# Patient Record
Sex: FEMALE | Race: BLACK OR AFRICAN AMERICAN | NOT HISPANIC OR LATINO | Employment: FULL TIME | URBAN - METROPOLITAN AREA
[De-identification: names, ages, dates, MRNs, and addresses within clinical notes are randomized per-mention and may not be internally consistent; named-entity substitution may affect disease eponyms.]

---

## 2019-10-29 ENCOUNTER — HOSPITAL ENCOUNTER (EMERGENCY)
Facility: HOSPITAL | Age: 24
Discharge: HOME | End: 2019-10-29
Attending: EMERGENCY MEDICINE
Payer: COMMERCIAL

## 2019-10-29 VITALS
TEMPERATURE: 98 F | DIASTOLIC BLOOD PRESSURE: 82 MMHG | OXYGEN SATURATION: 99 % | BODY MASS INDEX: 30.73 KG/M2 | HEIGHT: 62 IN | RESPIRATION RATE: 18 BRPM | WEIGHT: 167 LBS | SYSTOLIC BLOOD PRESSURE: 149 MMHG | HEART RATE: 60 BPM

## 2019-10-29 DIAGNOSIS — O20.0 THREATENED ABORTION: Primary | ICD-10-CM

## 2019-10-29 LAB
ABO + RH BLD: NORMAL
ALBUMIN SERPL BCP-MCNC: 4.1 G/DL (ref 3.5–5.2)
ALP SERPL-CCNC: 50 U/L (ref 38–126)
ALT SERPL W/O P-5'-P-CCNC: 20 U/L (ref 10–44)
ANION GAP SERPL CALC-SCNC: 9 MMOL/L (ref 8–16)
AST SERPL-CCNC: 35 U/L (ref 15–46)
B-HCG UR QL: POSITIVE
BACTERIA GENITAL QL WET PREP: ABNORMAL
BASOPHILS # BLD AUTO: 0.03 K/UL (ref 0–0.2)
BASOPHILS NFR BLD: 0.4 % (ref 0–1.9)
BILIRUB SERPL-MCNC: 0.3 MG/DL (ref 0.1–1)
BILIRUB UR QL STRIP: NEGATIVE
BLD GP AB SCN CELLS X3 SERPL QL: NORMAL
BUN SERPL-MCNC: 6 MG/DL (ref 7–17)
CALCIUM SERPL-MCNC: 9 MG/DL (ref 8.7–10.5)
CHLORIDE SERPL-SCNC: 103 MMOL/L (ref 95–110)
CLARITY UR REFRACT.AUTO: CLEAR
CLUE CELLS VAG QL WET PREP: ABNORMAL
CO2 SERPL-SCNC: 25 MMOL/L (ref 23–29)
COLOR UR AUTO: YELLOW
CREAT SERPL-MCNC: 0.64 MG/DL (ref 0.5–1.4)
DIFFERENTIAL METHOD: ABNORMAL
EOSINOPHIL # BLD AUTO: 0.1 K/UL (ref 0–0.5)
EOSINOPHIL NFR BLD: 1 % (ref 0–8)
ERYTHROCYTE [DISTWIDTH] IN BLOOD BY AUTOMATED COUNT: 14.2 % (ref 11.5–14.5)
EST. GFR  (AFRICAN AMERICAN): >60 ML/MIN/1.73 M^2
EST. GFR  (NON AFRICAN AMERICAN): >60 ML/MIN/1.73 M^2
FILAMENT FUNGI VAG WET PREP-#/AREA: ABNORMAL
GLUCOSE SERPL-MCNC: 93 MG/DL (ref 70–110)
GLUCOSE UR QL STRIP: NEGATIVE
HCG INTACT+B SERPL-ACNC: 1154 MIU/ML
HCT VFR BLD AUTO: 36.2 % (ref 37–48.5)
HGB BLD-MCNC: 11.8 G/DL (ref 12–16)
HGB UR QL STRIP: ABNORMAL
KETONES UR QL STRIP: NEGATIVE
LEUKOCYTE ESTERASE UR QL STRIP: NEGATIVE
LIPASE SERPL-CCNC: 44 U/L (ref 23–300)
LYMPHOCYTES # BLD AUTO: 2.4 K/UL (ref 1–4.8)
LYMPHOCYTES NFR BLD: 31 % (ref 18–48)
MCH RBC QN AUTO: 25.1 PG (ref 27–31)
MCHC RBC AUTO-ENTMCNC: 32.6 G/DL (ref 32–36)
MCV RBC AUTO: 77 FL (ref 82–98)
MONOCYTES # BLD AUTO: 0.9 K/UL (ref 0.3–1)
MONOCYTES NFR BLD: 11.5 % (ref 4–15)
NEUTROPHILS # BLD AUTO: 4.4 K/UL (ref 1.8–7.7)
NEUTROPHILS NFR BLD: 56.1 % (ref 38–73)
NITRITE UR QL STRIP: NEGATIVE
PH UR STRIP: 7 [PH] (ref 5–8)
PLATELET # BLD AUTO: 402 K/UL (ref 150–350)
PMV BLD AUTO: 8.9 FL (ref 9.2–12.9)
POTASSIUM SERPL-SCNC: 4 MMOL/L (ref 3.5–5.1)
PROT SERPL-MCNC: 7.3 G/DL (ref 6–8.4)
PROT UR QL STRIP: NEGATIVE
RBC # BLD AUTO: 4.71 M/UL (ref 4–5.4)
SODIUM SERPL-SCNC: 137 MMOL/L (ref 136–145)
SP GR UR STRIP: 1 (ref 1–1.03)
SPECIMEN SOURCE: ABNORMAL
T VAGINALIS GENITAL QL WET PREP: ABNORMAL
URN SPEC COLLECT METH UR: ABNORMAL
UROBILINOGEN UR STRIP-ACNC: NEGATIVE EU/DL
WBC # BLD AUTO: 7.85 K/UL (ref 3.9–12.7)
WBC #/AREA VAG WET PREP: ABNORMAL
YEAST GENITAL QL WET PREP: ABNORMAL

## 2019-10-29 PROCEDURE — 87210 SMEAR WET MOUNT SALINE/INK: CPT | Mod: ER

## 2019-10-29 PROCEDURE — 86901 BLOOD TYPING SEROLOGIC RH(D): CPT

## 2019-10-29 PROCEDURE — 99284 EMERGENCY DEPT VISIT MOD MDM: CPT | Mod: 25,ER

## 2019-10-29 PROCEDURE — 85025 COMPLETE CBC W/AUTO DIFF WBC: CPT | Mod: ER

## 2019-10-29 PROCEDURE — 80053 COMPREHEN METABOLIC PANEL: CPT | Mod: ER

## 2019-10-29 PROCEDURE — 81003 URINALYSIS AUTO W/O SCOPE: CPT | Mod: ER

## 2019-10-29 PROCEDURE — 81025 URINE PREGNANCY TEST: CPT | Mod: ER

## 2019-10-29 PROCEDURE — 84702 CHORIONIC GONADOTROPIN TEST: CPT | Mod: ER

## 2019-10-29 PROCEDURE — 25000003 PHARM REV CODE 250: Mod: ER | Performed by: PHYSICIAN ASSISTANT

## 2019-10-29 PROCEDURE — 83690 ASSAY OF LIPASE: CPT | Mod: ER

## 2019-10-29 PROCEDURE — 87491 CHLMYD TRACH DNA AMP PROBE: CPT | Mod: ER

## 2019-10-29 RX ORDER — ACETAMINOPHEN 500 MG
500 TABLET ORAL
Status: COMPLETED | OUTPATIENT
Start: 2019-10-29 | End: 2019-10-29

## 2019-10-29 RX ORDER — HYDROCODONE BITARTRATE AND ACETAMINOPHEN 5; 325 MG/1; MG/1
1 TABLET ORAL EVERY 12 HOURS PRN
Qty: 6 TABLET | Refills: 0 | Status: SHIPPED | OUTPATIENT
Start: 2019-10-29 | End: 2021-11-26 | Stop reason: ALTCHOICE

## 2019-10-29 RX ORDER — HYDROCODONE BITARTRATE AND ACETAMINOPHEN 5; 325 MG/1; MG/1
1 TABLET ORAL
Status: DISCONTINUED | OUTPATIENT
Start: 2019-10-29 | End: 2019-10-29 | Stop reason: HOSPADM

## 2019-10-29 RX ADMIN — ACETAMINOPHEN 500 MG: 500 TABLET ORAL at 12:10

## 2019-10-29 NOTE — DISCHARGE INSTRUCTIONS
You are instructed to follow up with    for re-evaluation and repeat beta HCG in 2 days.  You are advised to obtain a repeat ultrasound in 1 week.  You are instructed to return to the emergency department immediately for any new or worsening symptoms.

## 2019-10-29 NOTE — ED NOTES
Patient reports left lower suprapubic pain. She states she recently had a menstrual cycle that ended and then another one began 2 days later. She reports heavy vaginal bleeding.

## 2019-10-29 NOTE — ED PROVIDER NOTES
Encounter Date: 10/29/2019       History     Chief Complaint   Patient presents with    Abdominal Pain     Pt c/o lower left sided abdominal pain since last pm.  Denies urinary complications, denies vaginal d/c.  States has had two menstrual cycles this month, states is having heavy vaginal bleeding.  States bright red vaginal bleeding.       24-year-old female presents to the emergency department for evaluation of 2 day history of left lower quadrant abdominal pain and vaginal bleeding.  She reports that she had a normal menstrual cycle which lasted 4 days that began on 10/16 which was consistent with her normal menstrual cycles.  She states that she began bleeding again on 10/25 and it is been heavy since that time.  She reports finding out that her sexual partner has not monogamous.  She denies any vaginal discharge or pelvic pain prior to the bleeding starting.  She reports mild generalized weakness. She denies any headache, dizziness, fever, neck pain, chest pain, nausea, vomiting, flank pain or dysuria.  She attempted treatment with Midol last night with no relief of symptoms. No treatment was attempted today.  She reports that she does have a history of fibroids.        Review of patient's allergies indicates:  No Known Allergies  History reviewed. No pertinent past medical history.  Past Surgical History:   Procedure Laterality Date    TONSILLECTOMY       History reviewed. No pertinent family history.  Social History     Tobacco Use    Smoking status: Never Smoker    Smokeless tobacco: Never Used   Substance Use Topics    Alcohol use: Yes     Comment: socially    Drug use: Not Currently     Review of Systems   Constitutional: Negative for activity change, appetite change and fever.   HENT: Negative for congestion, ear discharge, rhinorrhea, sore throat and trouble swallowing.    Eyes: Negative for photophobia and visual disturbance.   Respiratory: Negative for cough, chest tightness, shortness of  breath and wheezing.    Cardiovascular: Negative for chest pain.   Gastrointestinal: Positive for abdominal pain. Negative for constipation, diarrhea, nausea and vomiting.   Genitourinary: Positive for pelvic pain and vaginal bleeding. Negative for dysuria, flank pain, genital sores and vaginal discharge.   Musculoskeletal: Negative for back pain and neck pain.   Skin: Negative for rash.   Neurological: Negative for dizziness, syncope, weakness, light-headedness, numbness and headaches.       Physical Exam     Initial Vitals [10/29/19 0931]   BP Pulse Resp Temp SpO2   (!) 166/83 76 18 98.2 °F (36.8 °C) 100 %      MAP       --         Physical Exam    Nursing note and vitals reviewed.  Constitutional: She appears well-developed and well-nourished. She is not diaphoretic. No distress.   HENT:   Head: Normocephalic and atraumatic.   Right Ear: External ear normal.   Left Ear: External ear normal.   Nose: Nose normal.   Mouth/Throat: Oropharynx is clear and moist.   Eyes: Conjunctivae and EOM are normal. Pupils are equal, round, and reactive to light.   Neck: Normal range of motion. Neck supple.   Cardiovascular: Normal rate, regular rhythm and normal heart sounds.   Pulmonary/Chest: Breath sounds normal. No respiratory distress. She has no wheezes. She has no rhonchi. She has no rales. She exhibits no tenderness.   Abdominal: Soft. Bowel sounds are normal. She exhibits no distension. There is tenderness in the left lower quadrant. There is no rebound, no CVA tenderness, no tenderness at McBurney's point and negative Little's sign.   Genitourinary: Pelvic exam was performed with patient supine. There is no rash, tenderness or lesion on the right labia. There is no rash, tenderness or lesion on the left labia. Cervix exhibits no motion tenderness, no discharge and no friability. Right adnexum displays no mass, no tenderness and no fullness. Left adnexum displays tenderness. Left adnexum displays no mass and no fullness.  There is bleeding in the vagina. No tenderness in the vagina. No foreign body in the vagina. No vaginal discharge found.   Lymphadenopathy:     She has no cervical adenopathy.   Neurological: She is alert and oriented to person, place, and time.   Skin: Skin is warm and dry.   Psychiatric: She has a normal mood and affect.         ED Course   Procedures  Labs Reviewed   C. TRACHOMATIS/N. GONORRHOEAE BY AMP DNA   URINALYSIS, REFLEX TO URINE CULTURE   PREGNANCY TEST, URINE RAPID   CBC W/ AUTO DIFFERENTIAL   COMPREHENSIVE METABOLIC PANEL   LIPASE   VAGINAL SCREEN          Imaging Results    None          Medical Decision Making:   Initial Assessment:   24-year-old female presents for evaluation of 2 day history of left lower quadrant abdominal pain and dysfunctional uterine bleeding.  Physical exam reveals a nontoxic-appearing female in no acute distress. Patient is afebrile vital signs within normal limits.  Neurological exam reveals an alert and oriented patient.  Abdominal exam reveals soft abdomen, mildly tender to palpation of the left lower quadrant.  No peritoneal signs noted. No CVA tenderness noted.  exam reveals scant blood noted in the vaginal vault.  No CMT noted. Mild left-sided adnexal tenderness noted.  Differential Diagnosis:    Pregnancy  UTI  Gonorrhea  Chlamydia  Trichomonas  Dysfunctional uterine bleeding    ED Management:  UPT positive.  Ultrasound ordered to assess for possible threatened  versus ectopic pregnancy.  Vaginal screen reveals no evidence of Trichomonas, yeast or bacterial vaginosis.  CBC reveals no acute leukocytosis and mild anemia.  Hemoglobin 11.8 and hematocrit 36.2.  CMP results within normal limits. Lipase 44.  Beta HCG 1154.  Urinalysis reveals no evidence of UTI.  Ultrasound reveals no evidence of intrauterine pregnancy visualized.  Recommend serial beta HCG.  Recommend repeat ultrasound in 1 week.  I discussed this patient at length with  who is in agreement  with the course of treatment.                       Clinical Impression:       ICD-10-CM ICD-9-CM   1. Threatened  O20.0 640.00                                Dorothy Lieberman PA-C  10/29/19 1411       Dorothy Lieberman PA-C  10/29/19 1412

## 2019-10-29 NOTE — ED NOTES
PA at bedside with ED tech for pelvic exam, patient explained procedure and verbalized understanding

## 2019-10-30 LAB
C TRACH DNA SPEC QL NAA+PROBE: NOT DETECTED
N GONORRHOEA DNA SPEC QL NAA+PROBE: NOT DETECTED

## 2020-08-01 ENCOUNTER — OUTSIDE PLACE OF SERVICE (OUTPATIENT)
Dept: CARDIOLOGY | Facility: CLINIC | Age: 25
End: 2020-08-01
Payer: COMMERCIAL

## 2020-08-01 PROCEDURE — 93010 ELECTROCARDIOGRAM REPORT: CPT | Mod: ,,, | Performed by: INTERNAL MEDICINE

## 2020-08-01 PROCEDURE — 93010 PR ELECTROCARDIOGRAM REPORT: ICD-10-PCS | Mod: ,,, | Performed by: INTERNAL MEDICINE

## 2021-01-28 ENCOUNTER — OUTSIDE PLACE OF SERVICE (OUTPATIENT)
Dept: CARDIOLOGY | Facility: CLINIC | Age: 26
End: 2021-01-28
Payer: COMMERCIAL

## 2021-01-28 PROCEDURE — 93010 PR ELECTROCARDIOGRAM REPORT: ICD-10-PCS | Mod: ,,, | Performed by: INTERNAL MEDICINE

## 2021-01-28 PROCEDURE — 93010 ELECTROCARDIOGRAM REPORT: CPT | Mod: ,,, | Performed by: INTERNAL MEDICINE

## 2021-08-17 NOTE — ADDENDUM NOTE
Addended by: ULISES MAY on: 8/10/2020 02:03 PM     Modules accepted: Level of Service, SmartSet     [Mother] : mother

## 2021-10-19 RX ORDER — COVID-19 MOLECULAR TEST ASSAY
KIT MISCELLANEOUS
COMMUNITY
Start: 2021-08-20

## 2021-10-19 RX ORDER — LOSARTAN POTASSIUM AND HYDROCHLOROTHIAZIDE 12.5; 5 MG/1; MG/1
1 TABLET ORAL EVERY MORNING
COMMUNITY
Start: 2021-06-28 | End: 2021-10-22

## 2021-10-19 RX ORDER — BUTALBITAL, ACETAMINOPHEN AND CAFFEINE 300; 40; 50 MG/1; MG/1; MG/1
CAPSULE ORAL
COMMUNITY
Start: 2021-04-28 | End: 2021-11-26 | Stop reason: ALTCHOICE

## 2021-10-19 RX ORDER — FLUTICASONE PROPIONATE 50 MCG
SPRAY, SUSPENSION (ML) NASAL
COMMUNITY
End: 2021-11-26 | Stop reason: ALTCHOICE

## 2021-10-19 RX ORDER — NIFEDIPINE 30 MG/1
TABLET, EXTENDED RELEASE ORAL
COMMUNITY
End: 2022-06-23 | Stop reason: ALTCHOICE

## 2021-10-19 RX ORDER — FERROUS SULFATE 325(65) MG
1 TABLET ORAL DAILY
COMMUNITY
Start: 2021-09-27

## 2021-10-19 RX ORDER — VALACYCLOVIR HYDROCHLORIDE 500 MG/1
TABLET, FILM COATED ORAL
COMMUNITY

## 2021-11-26 ENCOUNTER — HOSPITAL ENCOUNTER (EMERGENCY)
Facility: HOSPITAL | Age: 26
Discharge: HOME | End: 2021-11-27
Attending: FAMILY MEDICINE
Payer: COMMERCIAL

## 2021-11-26 DIAGNOSIS — J18.9 PNEUMONIA DUE TO INFECTIOUS ORGANISM, UNSPECIFIED LATERALITY, UNSPECIFIED PART OF LUNG: Primary | ICD-10-CM

## 2021-11-26 PROCEDURE — 99284 EMERGENCY DEPT VISIT MOD MDM: CPT | Mod: 25,ER

## 2021-11-26 RX ORDER — ASPIRIN 81 MG/1
81 TABLET ORAL DAILY
COMMUNITY

## 2021-11-27 ENCOUNTER — TELEPHONE (OUTPATIENT)
Dept: EMERGENCY MEDICINE | Facility: HOSPITAL | Age: 26
End: 2021-11-27
Payer: COMMERCIAL

## 2021-11-27 VITALS
RESPIRATION RATE: 18 BRPM | BODY MASS INDEX: 30.55 KG/M2 | OXYGEN SATURATION: 93 % | SYSTOLIC BLOOD PRESSURE: 121 MMHG | DIASTOLIC BLOOD PRESSURE: 81 MMHG | HEART RATE: 84 BPM | HEIGHT: 62 IN | TEMPERATURE: 98 F | WEIGHT: 166 LBS

## 2021-11-27 LAB
ALBUMIN SERPL BCP-MCNC: 3.7 G/DL (ref 3.5–5.2)
ALLENS TEST: ABNORMAL
ALP SERPL-CCNC: 57 U/L (ref 38–126)
ALT SERPL W/O P-5'-P-CCNC: 18 U/L (ref 10–44)
ANION GAP SERPL CALC-SCNC: 8 MMOL/L (ref 8–16)
AST SERPL-CCNC: 23 U/L (ref 15–46)
BACTERIA #/AREA URNS AUTO: NORMAL /HPF
BASOPHILS # BLD AUTO: 0.05 K/UL (ref 0–0.2)
BASOPHILS NFR BLD: 0.3 % (ref 0–1.9)
BILIRUB SERPL-MCNC: 0.3 MG/DL (ref 0.1–1)
BILIRUB UR QL STRIP: NEGATIVE
CALCIUM SERPL-MCNC: 8.4 MG/DL (ref 8.7–10.5)
CHLORIDE SERPL-SCNC: 104 MMOL/L (ref 95–110)
CLARITY UR REFRACT.AUTO: CLEAR
CO2 SERPL-SCNC: 24 MMOL/L (ref 23–29)
COLOR UR AUTO: ABNORMAL
CREAT SERPL-MCNC: 0.51 MG/DL (ref 0.5–1.4)
DIFFERENTIAL METHOD: ABNORMAL
EOSINOPHIL # BLD AUTO: 0.1 K/UL (ref 0–0.5)
EOSINOPHIL NFR BLD: 1 % (ref 0–8)
ERYTHROCYTE [DISTWIDTH] IN BLOOD BY AUTOMATED COUNT: 13.1 % (ref 11.5–14.5)
EST. GFR  (AFRICAN AMERICAN): >60 ML/MIN/1.73 M^2
EST. GFR  (NON AFRICAN AMERICAN): >60 ML/MIN/1.73 M^2
GLUCOSE SERPL-MCNC: 97 MG/DL (ref 70–110)
GLUCOSE UR QL STRIP: NEGATIVE
HCO3 UR-SCNC: 20.8 MMOL/L (ref 24–28)
HCT VFR BLD AUTO: 32.9 % (ref 37–48.5)
HCT VFR BLD CALC: 34 %PCV (ref 36–54)
HGB BLD-MCNC: 11.1 G/DL (ref 12–16)
HGB BLD-MCNC: 12 G/DL
HGB UR QL STRIP: ABNORMAL
IMM GRANULOCYTES # BLD AUTO: 0.28 K/UL (ref 0–0.04)
IMM GRANULOCYTES NFR BLD AUTO: 1.9 % (ref 0–0.5)
KETONES UR QL STRIP: NEGATIVE
LEUKOCYTE ESTERASE UR QL STRIP: NEGATIVE
LYMPHOCYTES # BLD AUTO: 2 K/UL (ref 1–4.8)
LYMPHOCYTES NFR BLD: 13.5 % (ref 18–48)
MCH RBC QN AUTO: 25.7 PG (ref 27–31)
MCHC RBC AUTO-ENTMCNC: 33.7 G/DL (ref 32–36)
MCV RBC AUTO: 76 FL (ref 82–98)
MICROSCOPIC COMMENT: NORMAL
MONOCYTES # BLD AUTO: 1 K/UL (ref 0.3–1)
MONOCYTES NFR BLD: 6.7 % (ref 4–15)
NEUTROPHILS # BLD AUTO: 11.2 K/UL (ref 1.8–7.7)
NEUTROPHILS NFR BLD: 76.6 % (ref 38–73)
NITRITE UR QL STRIP: NEGATIVE
NRBC BLD-RTO: 0 /100 WBC
NT-PROBNP SERPL-MCNC: 134 PG/ML (ref 5–450)
PCO2 BLDA: 33.8 MMHG (ref 35–45)
PH SMN: 7.4 [PH] (ref 7.35–7.45)
PH UR STRIP: 7 [PH] (ref 5–8)
PLATELET # BLD AUTO: 393 K/UL (ref 150–450)
PMV BLD AUTO: 8.5 FL (ref 9.2–12.9)
PO2 BLDA: 114 MMHG (ref 80–100)
POC BE: -4 MMOL/L
POC SATURATED O2: 98 % (ref 95–100)
POC TCO2: 22 MMOL/L (ref 23–27)
POTASSIUM BLD-SCNC: 3.8 MMOL/L (ref 3.5–5.1)
POTASSIUM SERPL-SCNC: 4.3 MMOL/L (ref 3.5–5.1)
PROT SERPL-MCNC: 7 G/DL (ref 6–8.4)
PROT UR QL STRIP: ABNORMAL
RBC # BLD AUTO: 4.32 M/UL (ref 4–5.4)
RBC #/AREA URNS AUTO: 2 /HPF (ref 0–4)
SAMPLE: ABNORMAL
SARS-COV-2 RDRP RESP QL NAA+PROBE: NEGATIVE
SITE: ABNORMAL
SODIUM BLD-SCNC: 135 MMOL/L (ref 136–145)
SODIUM SERPL-SCNC: 136 MMOL/L (ref 136–145)
SP GR UR STRIP: 1.01 (ref 1–1.03)
URN SPEC COLLECT METH UR: ABNORMAL
UROBILINOGEN UR STRIP-ACNC: NEGATIVE EU/DL
UUN UR-MCNC: 3 MG/DL (ref 7–17)
WBC # BLD AUTO: 14.56 K/UL (ref 3.9–12.7)
WBC #/AREA URNS AUTO: 0 /HPF (ref 0–5)

## 2021-11-27 PROCEDURE — 82803 BLOOD GASES ANY COMBINATION: CPT | Mod: ER

## 2021-11-27 PROCEDURE — 83880 ASSAY OF NATRIURETIC PEPTIDE: CPT | Mod: ER | Performed by: FAMILY MEDICINE

## 2021-11-27 PROCEDURE — 63600175 PHARM REV CODE 636 W HCPCS: Mod: ER | Performed by: FAMILY MEDICINE

## 2021-11-27 PROCEDURE — 96361 HYDRATE IV INFUSION ADD-ON: CPT | Mod: ER

## 2021-11-27 PROCEDURE — 80053 COMPREHEN METABOLIC PANEL: CPT | Mod: ER | Performed by: FAMILY MEDICINE

## 2021-11-27 PROCEDURE — 81000 URINALYSIS NONAUTO W/SCOPE: CPT | Mod: ER | Performed by: FAMILY MEDICINE

## 2021-11-27 PROCEDURE — 99900035 HC TECH TIME PER 15 MIN (STAT): Mod: ER

## 2021-11-27 PROCEDURE — 87040 BLOOD CULTURE FOR BACTERIA: CPT | Mod: 59,ER | Performed by: FAMILY MEDICINE

## 2021-11-27 PROCEDURE — 85025 COMPLETE CBC W/AUTO DIFF WBC: CPT | Mod: ER | Performed by: FAMILY MEDICINE

## 2021-11-27 PROCEDURE — U0002 COVID-19 LAB TEST NON-CDC: HCPCS | Mod: ER | Performed by: FAMILY MEDICINE

## 2021-11-27 PROCEDURE — 25000003 PHARM REV CODE 250: Mod: ER | Performed by: FAMILY MEDICINE

## 2021-11-27 PROCEDURE — 36600 WITHDRAWAL OF ARTERIAL BLOOD: CPT | Mod: ER

## 2021-11-27 PROCEDURE — 94760 N-INVAS EAR/PLS OXIMETRY 1: CPT | Mod: ER

## 2021-11-27 PROCEDURE — 96365 THER/PROPH/DIAG IV INF INIT: CPT | Mod: ER

## 2021-11-27 RX ORDER — AMOXICILLIN AND CLAVULANATE POTASSIUM 875; 125 MG/1; MG/1
1 TABLET, FILM COATED ORAL 2 TIMES DAILY
Qty: 14 TABLET | Refills: 0 | Status: SHIPPED | OUTPATIENT
Start: 2021-11-27 | End: 2022-05-12

## 2021-11-27 RX ORDER — AMOXICILLIN AND CLAVULANATE POTASSIUM 875; 125 MG/1; MG/1
1 TABLET, FILM COATED ORAL 2 TIMES DAILY
Qty: 14 TABLET | Refills: 0 | Status: SHIPPED | OUTPATIENT
Start: 2021-11-27 | End: 2021-11-27 | Stop reason: SDUPTHER

## 2021-11-27 RX ADMIN — SODIUM CHLORIDE 1000 ML: 0.9 INJECTION, SOLUTION INTRAVENOUS at 12:11

## 2021-11-27 RX ADMIN — CEFTRIAXONE 1 G: 1 INJECTION, SOLUTION INTRAVENOUS at 01:11

## 2021-12-02 LAB
BACTERIA BLD CULT: NORMAL
BACTERIA BLD CULT: NORMAL

## 2022-02-04 PROBLEM — B00.9 HSV-2 INFECTION COMPLICATING PREGNANCY, THIRD TRIMESTER: Status: ACTIVE | Noted: 2022-02-04

## 2022-02-04 PROBLEM — O98.513 HSV-2 INFECTION COMPLICATING PREGNANCY, THIRD TRIMESTER: Status: ACTIVE | Noted: 2022-02-04

## 2022-03-18 PROBLEM — O09.93 SUPERVISION OF HIGH RISK PREGNANCY IN THIRD TRIMESTER: Status: ACTIVE | Noted: 2022-03-18

## 2022-06-09 ENCOUNTER — TELEPHONE (OUTPATIENT)
Dept: LACTATION | Facility: CLINIC | Age: 27
End: 2022-06-09
Payer: COMMERCIAL

## 2022-06-09 NOTE — TELEPHONE ENCOUNTER
Pt inquired about safety of amloditine 5 mg while breastfeeding. Read from Dr. Aragon's Medications in Mother's Milk- L3 Limited Data- Probably Compatible,   Not contraindicated with breastfeeding. Monitor baby for Drowsiness, lethargy, pallor, poor feeding, and weight gain. Discuss with pediatrician and lactation with any concerns that may arise. Voices understanding and appreciation.

## 2022-06-23 PROBLEM — I10 HYPERTENSION: Status: ACTIVE | Noted: 2022-06-23

## 2022-08-02 ENCOUNTER — PATIENT MESSAGE (OUTPATIENT)
Dept: ADMINISTRATIVE | Facility: OTHER | Age: 27
End: 2022-08-02
Payer: COMMERCIAL

## 2022-08-02 ENCOUNTER — HOSPITAL ENCOUNTER (EMERGENCY)
Facility: HOSPITAL | Age: 27
Discharge: HOME | End: 2022-08-02
Attending: EMERGENCY MEDICINE
Payer: COMMERCIAL

## 2022-08-02 VITALS
TEMPERATURE: 98 F | BODY MASS INDEX: 29.63 KG/M2 | OXYGEN SATURATION: 99 % | HEIGHT: 62 IN | RESPIRATION RATE: 18 BRPM | HEART RATE: 62 BPM | DIASTOLIC BLOOD PRESSURE: 84 MMHG | SYSTOLIC BLOOD PRESSURE: 165 MMHG | WEIGHT: 161 LBS

## 2022-08-02 DIAGNOSIS — L29.9 ITCHING: Primary | ICD-10-CM

## 2022-08-02 DIAGNOSIS — T78.40XA ALLERGIC REACTION, INITIAL ENCOUNTER: ICD-10-CM

## 2022-08-02 LAB — B-HCG UR QL: NEGATIVE

## 2022-08-02 PROCEDURE — 81025 URINE PREGNANCY TEST: CPT | Mod: ER | Performed by: EMERGENCY MEDICINE

## 2022-08-02 PROCEDURE — 25000003 PHARM REV CODE 250: Mod: ER | Performed by: EMERGENCY MEDICINE

## 2022-08-02 PROCEDURE — 99283 EMERGENCY DEPT VISIT LOW MDM: CPT | Mod: ER

## 2022-08-02 RX ORDER — FAMOTIDINE 20 MG/1
40 TABLET, FILM COATED ORAL
Status: COMPLETED | OUTPATIENT
Start: 2022-08-02 | End: 2022-08-02

## 2022-08-02 RX ORDER — MONTELUKAST SODIUM 10 MG/1
10 TABLET ORAL NIGHTLY
Qty: 5 TABLET | Refills: 0 | Status: SHIPPED | OUTPATIENT
Start: 2022-08-02 | End: 2022-08-07

## 2022-08-02 RX ORDER — AMLODIPINE BESYLATE 5 MG/1
10 TABLET ORAL
Status: COMPLETED | OUTPATIENT
Start: 2022-08-02 | End: 2022-08-02

## 2022-08-02 RX ADMIN — AMLODIPINE BESYLATE 10 MG: 5 TABLET ORAL at 03:08

## 2022-08-02 RX ADMIN — FAMOTIDINE 40 MG: 20 TABLET ORAL at 03:08

## 2022-08-02 NOTE — ED PROVIDER NOTES
Encounter Date: 8/2/2022       History     Chief Complaint   Patient presents with    Itching     Pt c/o itching all over and rash. States she was seen at urgent care Sunday and given steroid shot and sent home with steroid rx     Johana Callejas is a 27 y.o. female who  has a past medical history of Essential hypertension (11/18/2020 10:10:35 AM), Essential hypertension (11/18/2020 10:10:35 AM), Gonococcal infection (acute) of lower genitourinary tract (11/18/2020 10:17:14 AM), Gonococcal infection (acute) of lower genitourinary tract (11/18/2020 10:17:14 AM), Gonorrhea, Herpes simplex (11/18/2020 10:16:32 AM), Herpes simplex (11/18/2020 10:16:32 AM), History of other drug therapy (9/20/2021 1:37:11 PM), History of other drug therapy (9/20/2021 1:37:11 PM), HSV infection, and HTN (hypertension).    The patient presents to the ED due to rash and itching.  Patient reports that she was seen 2 days ago at urgent care and given a steroid shot antibiotics steroid prescription and has been taking hydrocortisone cream and Benadryl for itching.  She states that the rash has spread to her upper legs and her back.  She reports persistent itching denies any other symptoms. No history of similar rash in close contacts.  Of note patient's blood pressure is elevated she normally takes amlodipine in the mornings.  No other concerns noted today.  Patient is currently breastfeeding.        Review of patient's allergies indicates:  No Known Allergies  Past Medical History:   Diagnosis Date    Essential hypertension 11/18/2020 10:10:35 AM    [facility] Historical - Cardiovascular: Hypertension-No Additional Notes    Essential hypertension 11/18/2020 10:10:35 AM    [facility] Historical - Cardiovascular: Hypertension-No Additional Notes    Gonococcal infection (acute) of lower genitourinary tract 11/18/2020 10:17:14 AM    [facility] Historical - Gynecologic: Gonorrhea-No Additional Notes    Gonococcal infection (acute) of  lower genitourinary tract 11/18/2020 10:17:14 AM    [facility] Historical - Gynecologic: Gonorrhea-No Additional Notes    Gonorrhea     Herpes simplex 11/18/2020 10:16:32 AM    [facility] Historical - Quick Add: HSV (herpes simplex virus) anogenital infection-No Additional Notes    Herpes simplex 11/18/2020 10:16:32 AM    [facility] Historical - Quick Add: HSV (herpes simplex virus) anogenital infection-No Additional Notes    History of other drug therapy 9/20/2021 1:37:11 PM    [facility] Historical - Unknown: COVID-19 vaccine series completed-No Additional Notes    History of other drug therapy 9/20/2021 1:37:11 PM    [facility] Historical - Unknown: COVID-19 vaccine series completed-No Additional Notes    HSV infection     HTN (hypertension)      Past Surgical History:   Procedure Laterality Date    SALPINGOOPHORECTOMY Left     TONSILLECTOMY       Family History   Problem Relation Age of Onset    Hypertension Father     Mental retardation Brother     Gestational diabetes Paternal Grandmother     Breast cancer Paternal Grandmother      Social History     Tobacco Use    Smoking status: Never Smoker    Smokeless tobacco: Never Used   Substance Use Topics    Alcohol use: Not Currently     Comment: socially    Drug use: Not Currently     Review of Systems   Constitutional: Negative for chills and fever.   HENT: Negative for sore throat.    Respiratory: Negative for cough and shortness of breath.    Cardiovascular: Negative for chest pain.   Gastrointestinal: Negative for nausea and vomiting.   Genitourinary: Negative for dysuria, frequency and urgency.   Musculoskeletal: Negative for back pain, neck pain and neck stiffness.   Skin: Positive for rash. Negative for wound.   Neurological: Negative for syncope and weakness.   Hematological: Does not bruise/bleed easily.   Psychiatric/Behavioral: Negative for agitation, behavioral problems and confusion.       Physical Exam     Initial Vitals  [08/02/22 0252]   BP Pulse Resp Temp SpO2   (!) 184/101 (!) 58 18 97.9 °F (36.6 °C) 96 %      MAP       --         Physical Exam    Constitutional: No distress.   HENT:   Head: Normocephalic and atraumatic.   Eyes: Conjunctivae are normal.   Cardiovascular: Intact distal pulses.   Pulmonary/Chest: No respiratory distress.     Neurological: She is alert.   Skin: Skin is warm and dry.   Faint areas non erythematous areas of papules, no ulceration, no vesicles.  No surrounding signs of infection.   Psychiatric: She has a normal mood and affect.         ED Course   Procedures  Labs Reviewed   PREGNANCY TEST, URINE RAPID    Narrative:     Specimen Source->Urine          Imaging Results    None          Medications   amLODIPine tablet 10 mg (10 mg Oral Given 8/2/22 0338)   famotidine tablet 40 mg (40 mg Oral Given 8/2/22 0338)     Medical Decision Making:   Differential Diagnosis:   Differential Diagnosis includes, but is not limited to:  Necrotizing fasciitis, erythema multiforme, Hernandez-Piotr syndrome, toxic epidermal necrolysis, DIC, cellulitis, Staph scalded skin syndrome, toxic shock syndrome, secondary syphilis, abscess, osteomyelitis, septic joint, MRSA, DVT, superficial thrombophlebitis, varicose vein, drug eruption, allergic reaction/urticatia, irritant/contact dermatitis, viral exanthem, local trauma/contusion, abrasion.    ED Management:  27-year-old female presenting today with refractory pruritus and worsening rash.  She was treated for allergic reaction.  Her vital signs are remarkable for high blood pressure which improved after observation and she was given her home dose of amlodipine.    It appears as if she has a nonspecific dermatitis.  She is already taking Benadryl hydrocortisone cream topical steroids and on antibiotics.  Will try montelukast for better symptomatic control and place referral for her to follow-up with Allergy.  Discussed caution with breast feeding with montelukast and she was  given information about this drug.  No emergent process has been identified her rash and history are not consistent with infection.  Discussed return precautions for worsening symptoms new symptoms such as fever shortness of breath or any other concerns.    After taking into careful account the historical factors and physical exam findings of the patient's presentation today, in conjunction with the empirical and objective data obtained on ED workup, no acute emergent medical condition has been identified. The patient appears to be low risk for an emergent medical condition and I feel it is safe and appropriate at this time for the patient to be discharged to follow-up as detailed in their discharge instructions for reevaluation and possible continued outpatient workup and management. I have discussed the specifics of the workup with the patient and the patient has verbalized understanding of the details of the workup, the diagnosis, the treatment plan, and the need for outpatient follow-up.  Although the patient has no emergent etiology today this does not preclude the development of an emergent condition so in addition, I have advised the patient that they can return to the ED and/or activate EMS at any time with worsening of their symptoms, change of their symptoms, or with any other medical complaint.  The patient remained comfortable and stable during their visit in the ED.  Discharge and follow-up instructions discussed with the patient who expressed understanding and willingness to comply with my recommendations.                        Clinical Impression:   Final diagnoses:  [L29.9] Itching (Primary)  [T78.40XA] Allergic reaction, initial encounter          ED Disposition Condition    Discharge Stable        ED Prescriptions     Medication Sig Dispense Start Date End Date Auth. Provider    montelukast (SINGULAIR) 10 mg tablet Take 1 tablet (10 mg total) by mouth every evening. for 5 days 5 tablet 8/2/2022  8/7/2022 Addy Galindo Jr., MD        Follow-up Information     Follow up With Specialties Details Why Contact Info    Vivienne Oconnor MD Internal Medicine   [address] [facility] [address]  [phone]      [facility]   [address] [place] [address]  [phone]        Portions of this note were dictated using voice recognition software and may contain dictation related errors in spelling/grammar/syntax not found on text review       Addy Galindo Jr., MD  08/02/22 2856

## 2022-09-11 ENCOUNTER — HOSPITAL ENCOUNTER (EMERGENCY)
Facility: HOSPITAL | Age: 27
Discharge: HOME | End: 2022-09-11
Attending: EMERGENCY MEDICINE
Payer: COMMERCIAL

## 2022-09-11 VITALS
RESPIRATION RATE: 20 BRPM | HEART RATE: 86 BPM | SYSTOLIC BLOOD PRESSURE: 162 MMHG | TEMPERATURE: 99 F | BODY MASS INDEX: 29.26 KG/M2 | OXYGEN SATURATION: 98 % | HEIGHT: 62 IN | DIASTOLIC BLOOD PRESSURE: 94 MMHG | WEIGHT: 159 LBS

## 2022-09-11 DIAGNOSIS — B34.9 VIRAL ILLNESS: Primary | ICD-10-CM

## 2022-09-11 LAB
GROUP A STREP, MOLECULAR: NEGATIVE
INFLUENZA A, MOLECULAR: NEGATIVE
INFLUENZA B, MOLECULAR: NEGATIVE
SARS-COV-2 RDRP RESP QL NAA+PROBE: NEGATIVE
SPECIMEN SOURCE: NORMAL

## 2022-09-11 PROCEDURE — 99283 EMERGENCY DEPT VISIT LOW MDM: CPT | Mod: ER

## 2022-09-11 PROCEDURE — 87502 INFLUENZA DNA AMP PROBE: CPT | Mod: ER | Performed by: EMERGENCY MEDICINE

## 2022-09-11 PROCEDURE — 87651 STREP A DNA AMP PROBE: CPT | Mod: ER | Performed by: EMERGENCY MEDICINE

## 2022-09-11 PROCEDURE — U0002 COVID-19 LAB TEST NON-CDC: HCPCS | Mod: ER | Performed by: EMERGENCY MEDICINE

## 2022-09-11 PROCEDURE — 87502 INFLUENZA DNA AMP PROBE: CPT | Mod: ER

## 2022-09-11 NOTE — ED PROVIDER NOTES
Encounter Date: 9/11/2022       History     Chief Complaint   Patient presents with    COVID-19 Concerns     C/o sore throat, cough, and congestion. States children had covid 2 weeks ago.     HPI  27-year-old female with a history of hypertension presenting with 4 days of sore throat, cough, back pain, children with COVID 2 weeks ago  Patient denies difficulty breathing, chest pain    Review of patient's allergies indicates:  No Known Allergies  Past Medical History:   Diagnosis Date    Essential hypertension 11/18/2020 10:10:35 AM    [facility] Historical - Cardiovascular: Hypertension-No Additional Notes    Essential hypertension 11/18/2020 10:10:35 AM    [facility] Historical - Cardiovascular: Hypertension-No Additional Notes    Gonococcal infection (acute) of lower genitourinary tract 11/18/2020 10:17:14 AM    [facility] Historical - Gynecologic: Gonorrhea-No Additional Notes    Gonococcal infection (acute) of lower genitourinary tract 11/18/2020 10:17:14 AM    [facility] Historical - Gynecologic: Gonorrhea-No Additional Notes    Gonorrhea     Herpes simplex 11/18/2020 10:16:32 AM    [facility] Historical - Quick Add: HSV (herpes simplex virus) anogenital infection-No Additional Notes    Herpes simplex 11/18/2020 10:16:32 AM    [facility] Historical - Quick Add: HSV (herpes simplex virus) anogenital infection-No Additional Notes    History of other drug therapy 9/20/2021 1:37:11 PM    [facility] Historical - Unknown: COVID-19 vaccine series completed-No Additional Notes    History of other drug therapy 9/20/2021 1:37:11 PM    [facility] Historical - Unknown: COVID-19 vaccine series completed-No Additional Notes    HSV infection     HTN (hypertension)      Past Surgical History:   Procedure Laterality Date    SALPINGOOPHORECTOMY Left     TONSILLECTOMY       Family History   Problem Relation Age of Onset    Hypertension Father     Mental retardation Brother     Gestational diabetes Paternal  Grandmother     Breast cancer Paternal Grandmother      Social History     Tobacco Use    Smoking status: Never    Smokeless tobacco: Never   Substance Use Topics    Alcohol use: Not Currently     Comment: socially    Drug use: Not Currently     Review of Systems   Constitutional:  Positive for chills. Negative for appetite change, diaphoresis and fever.   HENT:  Positive for congestion and sore throat. Negative for nosebleeds, trouble swallowing and voice change.    Eyes:  Negative for photophobia, pain, redness and itching.   Respiratory:  Positive for cough. Negative for chest tightness and shortness of breath.    Cardiovascular:  Negative for chest pain and leg swelling.   Gastrointestinal:  Negative for abdominal pain, constipation, diarrhea, nausea and vomiting.   Genitourinary:  Negative for decreased urine volume, difficulty urinating, dysuria and frequency.   Musculoskeletal:  Positive for myalgias. Negative for gait problem.   Skin:  Negative for color change, rash and wound.   Neurological:  Negative for dizziness, facial asymmetry, speech difficulty and headaches.   Psychiatric/Behavioral:  Negative for agitation, confusion and suicidal ideas.    All other systems reviewed and are negative.    Physical Exam     Initial Vitals [09/11/22 0743]   BP Pulse Resp Temp SpO2   (!) 162/94 86 20 98.8 °F (37.1 °C) 98 %      MAP       --         Physical Exam    Nursing note and vitals reviewed.  Constitutional:   EXAM  General: Awake, alert and oriented. No acute distress.     Head: normocephalic and atraumatic     Eyes: Conjunctivae are clear without exudates or hemorrhage. Sclera is non-icteric. EOM are intact. Eyelids are normal in appearance without swelling or lesions.     Ears: The external ear and ear canal are non-tender and without swelling. The canal is clear without discharge. Hearing intact.     Nose: Nares are patent bilaterally.     Neck: The neck is supple. Trachea is midline. Full ROM.     Cardiac:  Regular rate.     Respiratory: No signs of respiratory distress. No audible wheezes.  Clear lungs bilaterally     Abdominal: Non-distended.     Extremities: Upper and lower extremities are atraumatic in appearance without tenderness or deformity. No swelling or erythema. Full range of motion.     Skin: Appropriate color for ethnicity.     Neurological: The patient is awake, alert and oriented to person, place, and time with normal speech.     Psychiatric: Appropriate mood and affect.     In light of current/ongoing global covid-19 pandemic, all my encounters w pt were with full ppe including but not limited to gown, gloves, n95, eye protection OR from >6 ft away.           ED Course   Procedures  Labs Reviewed   INFLUENZA A & B BY MOLECULAR   GROUP A STREP, MOLECULAR   SARS-COV-2 RNA AMPLIFICATION, QUAL    Narrative:     Is the patient symptomatic?->Yes          Imaging Results    None          Medications - No data to display  Medical Decision Making:   Clinical Tests:   Lab Tests: Ordered and Reviewed  ED Management:  Well-appearing.  Reassuring vital signs, COVID, flu, rapid strep negative.  Likely viral illness.  Work note provided.  Expectant management.                    Clinical Impression:   Final diagnoses:  [B34.9] Viral illness (Primary)      ED Disposition Condition    Discharge Stable          ED Prescriptions    None       Follow-up Information       Follow up With Specialties Details Why Contact Info    Vivienne Oconnor MD Internal Medicine   [address] [facility] [address]  [phone]               Yumiko Martel MD  09/11/22 8682

## 2022-09-21 ENCOUNTER — PATIENT MESSAGE (OUTPATIENT)
Dept: ALLERGY | Facility: CLINIC | Age: 27
End: 2022-09-21
Payer: COMMERCIAL

## 2022-12-03 ENCOUNTER — HOSPITAL ENCOUNTER (EMERGENCY)
Facility: HOSPITAL | Age: 27
Discharge: HOME | End: 2022-12-03
Attending: FAMILY MEDICINE
Payer: COMMERCIAL

## 2022-12-03 VITALS
RESPIRATION RATE: 15 BRPM | HEART RATE: 73 BPM | WEIGHT: 160 LBS | BODY MASS INDEX: 29.26 KG/M2 | SYSTOLIC BLOOD PRESSURE: 131 MMHG | OXYGEN SATURATION: 98 % | DIASTOLIC BLOOD PRESSURE: 56 MMHG | TEMPERATURE: 98 F

## 2022-12-03 DIAGNOSIS — T07.XXXA MULTIPLE CONTUSIONS: Primary | ICD-10-CM

## 2022-12-03 DIAGNOSIS — M25.569 KNEE PAIN: ICD-10-CM

## 2022-12-03 PROCEDURE — 99283 EMERGENCY DEPT VISIT LOW MDM: CPT | Mod: ER

## 2022-12-03 PROCEDURE — 25000003 PHARM REV CODE 250: Mod: ER | Performed by: PHYSICIAN ASSISTANT

## 2022-12-03 RX ORDER — NAPROXEN 250 MG/1
500 TABLET ORAL
Status: COMPLETED | OUTPATIENT
Start: 2022-12-03 | End: 2022-12-03

## 2022-12-03 RX ORDER — NAPROXEN 375 MG/1
375 TABLET ORAL 2 TIMES DAILY PRN
Qty: 60 TABLET | Refills: 0 | Status: SHIPPED | OUTPATIENT
Start: 2022-12-03

## 2022-12-03 RX ADMIN — NAPROXEN 500 MG: 250 TABLET ORAL at 06:12

## 2022-12-04 NOTE — ED PROVIDER NOTES
"Encounter Date: 12/3/2022       History     Chief Complaint   Patient presents with    Knee Pain     Reports dropped a piece of wood on both knees yesterday. Reports pain has gotten worse today. Reports "shooting" pain to R knee that radiates up R leg.      Patient is a 27-year-old female with no significant medical history presents to the emergency department with bilateral knee pain.  Yesterday a drill, she was moving something onto a truck when it fell on both her knees.  She reports pain with range of motion.    The history is provided by the patient.   Review of patient's allergies indicates:  No Known Allergies  Past Medical History:   Diagnosis Date    Essential hypertension 11/18/2020 10:10:35 AM    [facility] Historical - Cardiovascular: Hypertension-No Additional Notes    Essential hypertension 11/18/2020 10:10:35 AM    [facility] Historical - Cardiovascular: Hypertension-No Additional Notes    Gonococcal infection (acute) of lower genitourinary tract 11/18/2020 10:17:14 AM    [facility] Historical - Gynecologic: Gonorrhea-No Additional Notes    Gonococcal infection (acute) of lower genitourinary tract 11/18/2020 10:17:14 AM    [facility] Historical - Gynecologic: Gonorrhea-No Additional Notes    Gonorrhea     Herpes simplex 11/18/2020 10:16:32 AM    [facility] Historical - Quick Add: HSV (herpes simplex virus) anogenital infection-No Additional Notes    Herpes simplex 11/18/2020 10:16:32 AM    [facility] - Quick Add: HSV (herpes simplex virus) anogenital infection-No Additional Notes    History of other drug therapy 9/20/2021 1:37:11 PM    [facility] Historical - Unknown: COVID-19 vaccine series completed-No Additional Notes    History of other drug therapy 9/20/2021 1:37:11 PM    [facility] Historical - Unknown: COVID-19 vaccine series completed-No Additional Notes    HSV infection     HTN (hypertension)      Past Surgical History:   Procedure Laterality Date    " SALPINGOOPHORECTOMY Left     TONSILLECTOMY       Family History   Problem Relation Age of Onset    Hypertension Father     Mental retardation Brother     Gestational diabetes Paternal Grandmother     Breast cancer Paternal Grandmother      Social History     Tobacco Use    Smoking status: Never    Smokeless tobacco: Never   Substance Use Topics    Alcohol use: Not Currently     Comment: socially    Drug use: Not Currently     Review of Systems   Constitutional:  Negative for activity change, appetite change, chills, fatigue and fever.   HENT:  Negative for congestion, ear discharge, ear pain, postnasal drip, rhinorrhea and sore throat.    Respiratory:  Negative for cough and shortness of breath.    Cardiovascular:  Negative for chest pain.   Gastrointestinal:  Negative for abdominal pain, blood in stool, constipation, diarrhea, nausea and vomiting.   Genitourinary:  Negative for dysuria, flank pain and hematuria.   Musculoskeletal:  Negative for back pain, neck pain and neck stiffness.        Bilateral knee pain   Skin:  Negative for rash and wound.   Neurological:  Negative for dizziness, light-headedness and headaches.     Physical Exam     Initial Vitals [12/03/22 1751]   BP Pulse Resp Temp SpO2   (!) 206/88 73 18 98 °F (36.7 °C) 100 %      MAP       --         Physical Exam    Nursing note and vitals reviewed.  Constitutional: She appears well-developed and well-nourished. She is not diaphoretic. No distress.   HENT:   Head: Normocephalic.   Right Ear: External ear normal.   Left Ear: External ear normal.   Nose: Nose normal.   Mouth/Throat: Oropharynx is clear and moist.   Eyes: Conjunctivae are normal. Pupils are equal, round, and reactive to light.   Neck:   Normal range of motion.  Cardiovascular:  Normal rate and regular rhythm.           Pulmonary/Chest: Breath sounds normal.   Abdominal: There is no abdominal tenderness.   Musculoskeletal:      Cervical back: Normal range of motion.      Right lower leg:  Bony tenderness present. No swelling.      Left lower leg: Bony tenderness present. No swelling.     Neurological: She is alert and oriented to person, place, and time.   Skin: Skin is warm. Capillary refill takes less than 2 seconds.   Psychiatric: She has a normal mood and affect.       ED Course   Procedures  Labs Reviewed - No data to display       Imaging Results              X-Ray Knee 3 View Bilateral (Final result)  Result time 12/03/22 18:53:51      Final result by Sheree Casarez MD (12/03/22 18:53:51)                   Impression:    FINDINGS/  No acute fracture or dislocation identified.  No sizable joint effusion.  Mineralization is normal and joint spaces maintained.      Electronically signed by: Sheree Casarez  Date:    12/03/2022  Time:    18:53               Narrative:    EXAMINATION:  XR KNEE 3 VIEW BILATERAL    CLINICAL HISTORY:  Pain in unspecified knee    COMPARISON:  None                                       Medications   naproxen tablet 500 mg (500 mg Oral Given 12/3/22 1826)     Medical Decision Making:   Initial Assessment:   Urgent evaluation of a 27-year-old female who presents to the emergency department with bilateral knee pain.  Patient had a piece of wood fall on both of her knees.  On exam there is no obvious deformity.  No significant swelling or bruising.  Bony tenderness bilaterally.  X-ray confirms no acute osseous injury.  Advised to take naproxen.  Advised to follow up with PCP and Ortho.  Advised to return to the emergency department with any worsening symptoms or concerns.                        Clinical Impression:   Final diagnoses:  [M25.569] Knee pain  [T07.XXXA] Multiple contusions (Primary)        ED Disposition Condition    Discharge Stable          ED Prescriptions       Medication Sig Dispense Start Date End Date Auth. Provider    naproxen (NAPROSYN) 375 MG tablet Take 1 tablet (375 mg total) by mouth 2 (two) times daily as needed (take wtih food - take for  pain). 60 tablet 12/3/2022 -- Enedina Zarate PA-C          Follow-up Information    None          Enedina Zarate PA-C  12/03/22 1916

## 2024-04-15 DIAGNOSIS — M25.561 ACUTE PAIN OF BOTH KNEES: Primary | ICD-10-CM

## 2024-04-15 DIAGNOSIS — M25.562 ACUTE PAIN OF BOTH KNEES: Primary | ICD-10-CM

## 2024-04-16 ENCOUNTER — HOSPITAL ENCOUNTER (OUTPATIENT)
Dept: RADIOLOGY | Facility: HOSPITAL | Age: 29
Discharge: HOME | End: 2024-04-16
Attending: ORTHOPAEDIC SURGERY
Payer: COMMERCIAL

## 2024-04-16 ENCOUNTER — OFFICE VISIT (OUTPATIENT)
Dept: ORTHOPEDICS | Facility: CLINIC | Age: 29
End: 2024-04-16
Payer: COMMERCIAL

## 2024-04-16 VITALS — WEIGHT: 160.06 LBS | BODY MASS INDEX: 29.45 KG/M2 | HEIGHT: 62 IN

## 2024-04-16 DIAGNOSIS — G89.29 CHRONIC PAIN OF LEFT KNEE: ICD-10-CM

## 2024-04-16 DIAGNOSIS — M25.561 CHRONIC PAIN OF RIGHT KNEE: Primary | ICD-10-CM

## 2024-04-16 DIAGNOSIS — G89.29 CHRONIC PAIN OF RIGHT KNEE: Primary | ICD-10-CM

## 2024-04-16 DIAGNOSIS — M25.561 ACUTE PAIN OF BOTH KNEES: ICD-10-CM

## 2024-04-16 DIAGNOSIS — M25.562 CHRONIC PAIN OF LEFT KNEE: ICD-10-CM

## 2024-04-16 DIAGNOSIS — M25.562 ACUTE PAIN OF BOTH KNEES: ICD-10-CM

## 2024-04-16 DIAGNOSIS — M62.81 QUADRICEPS WEAKNESS: ICD-10-CM

## 2024-04-16 PROCEDURE — 73564 X-RAY EXAM KNEE 4 OR MORE: CPT | Mod: 26,,, | Performed by: RADIOLOGY

## 2024-04-16 PROCEDURE — 73564 X-RAY EXAM KNEE 4 OR MORE: CPT | Mod: TC,50,PN

## 2024-04-16 PROCEDURE — 99999 PR PBB SHADOW E&M-EST. PATIENT-LVL III: CPT | Mod: PBBFAC,,, | Performed by: ORTHOPAEDIC SURGERY

## 2024-04-16 PROCEDURE — 20610 DRAIN/INJ JOINT/BURSA W/O US: CPT | Mod: 50,,, | Performed by: ORTHOPAEDIC SURGERY

## 2024-04-16 PROCEDURE — 20610 DRAIN/INJ JOINT/BURSA W/O US: CPT | Mod: PBBFAC,50 | Performed by: ORTHOPAEDIC SURGERY

## 2024-04-16 PROCEDURE — 99204 OFFICE O/P NEW MOD 45 MIN: CPT | Mod: 25,,, | Performed by: ORTHOPAEDIC SURGERY

## 2024-04-16 RX ORDER — KETOROLAC TROMETHAMINE 30 MG/ML
30 INJECTION, SOLUTION INTRAMUSCULAR; INTRAVENOUS
Status: DISCONTINUED | OUTPATIENT
Start: 2024-04-16 | End: 2024-04-16 | Stop reason: HOSPADM

## 2024-04-16 RX ORDER — BUPIVACAINE HYDROCHLORIDE 5 MG/ML
5 INJECTION, SOLUTION PERINEURAL
Status: DISCONTINUED | OUTPATIENT
Start: 2024-04-16 | End: 2024-04-16 | Stop reason: HOSPADM

## 2024-04-16 RX ORDER — LIDOCAINE HYDROCHLORIDE 10 MG/ML
4 INJECTION INFILTRATION; PERINEURAL
Status: DISCONTINUED | OUTPATIENT
Start: 2024-04-16 | End: 2024-04-16 | Stop reason: HOSPADM

## 2024-04-16 RX ADMIN — BUPIVACAINE HYDROCHLORIDE 5 ML: 5 INJECTION, SOLUTION PERINEURAL at 11:04

## 2024-04-16 RX ADMIN — LIDOCAINE HYDROCHLORIDE 4 ML: 10 INJECTION INFILTRATION; PERINEURAL at 11:04

## 2024-04-16 RX ADMIN — KETOROLAC TROMETHAMINE 30 MG: 30 INJECTION, SOLUTION INTRAMUSCULAR; INTRAVENOUS at 11:04

## 2024-04-16 NOTE — PROGRESS NOTES
[facility] [address]          Subjective:     Patient ID: Johana Spann is a 28 y.o. female.    Chief Complaint: Pain of the Left Knee and Pain of the Right Knee       Patient ID: Johana Spann is a 28 y.o. female presenting with the left worse than right knee pain.  She was involved in a work-related injury in December 2022.  She has been unable to obtain follow up until now.  This is a line of duty injury in the .  She was not following worker's compensation because of that.  She describes the pain as a dull throbbing nerve pain.  She was Pain with going up stairs, she has stiffness with prolonged sitting. She has occasional popping clicking     Chief Complaint: Pain of the Left Knee and Pain of the Right Knee      KNEE PAIN: Complains of pain to the bilateral left > right knee.   PAIN LOCATED: anterior  ONSET:  She had an injury at work in December of 2022 where a side board fell to both of her knees from a  truck  QUALITY:  Patient states the pain is worsening  HISTORY: Previous knee injury/surgery: no  ASSOCIATED SYMPTOM AND TRIGGERS:Standing/Weightbearing, walking, trouble w stairs, stiffness, swelling, limping, stiffness w sitting , Catching/locking, and Knee Feels Unsteady  Has tried and failed cardiovascular activities such as walking, biking and resistance exercises  USES ASSISTIVE DEVICE: none  RELIEVED BY:rest, antiinflammatories  PATIENT DENIES: bruising, redness, deformity         Past Medical History:   Diagnosis Date    Essential hypertension 11/18/2020 10:10:35 AM    [facility] Historical - Cardiovascular: Hypertension-No Additional Notes    Essential hypertension 11/18/2020 10:10:35 AM    [facility] Historical - Cardiovascular: Hypertension-No Additional Notes    Gonococcal infection (acute) of lower genitourinary tract 11/18/2020 10:17:14 AM    [facility] Historical - Gynecologic: Gonorrhea-No Additional Notes    Gonococcal infection (acute) of lower genitourinary tract  11/18/2020 10:17:14 AM    [facility] Historical - Gynecologic: Gonorrhea-No Additional Notes    Gonorrhea     Herpes simplex 11/18/2020 10:16:32 AM    [facility] - Quick Add: HSV (herpes simplex virus) anogenital infection-No Additional Notes    Herpes simplex 11/18/2020 10:16:32 AM    [facility] - Quick Add: HSV (herpes simplex virus) anogenital infection-No Additional Notes    History of other drug therapy 9/20/2021 1:37:11 PM    [facility] Historical - Unknown: COVID-19 vaccine series completed-No Additional Notes    History of other drug therapy 9/20/2021 1:37:11 PM    [facility] Historical - Unknown: COVID-19 vaccine series completed-No Additional Notes    HSV infection     HTN (hypertension)         Past Surgical History:   Procedure Laterality Date    SALPINGOOPHORECTOMY Left     TONSILLECTOMY          Current Outpatient Medications   Medication Instructions    amLODIPine (NORVASC) 5 mg, Oral, Daily    aspirin (ECOTRIN) 81 mg, Oral, Daily    ferrous sulfate (FEOSOL) 325 mg (65 mg iron) Tab tablet 1 tablet, Oral, Daily    ID NOW COVID-19 TEST KIT Kit AS DIRECTED    naproxen (NAPROSYN) 375 mg, Oral, 2 times daily PRN    prenatal vit no.124/iron/folic (PRENATAL VITAMIN ORAL) Oral    valACYclovir (VALTREX) 500 MG tablet Valtrex 500 mg oral tablet take 1 tablet (500 mg) by oral route once daily   Active        Review of patient's allergies indicates:  No Known Allergies    Social History     Socioeconomic History    Marital status:    Tobacco Use    Smoking status: Never    Smokeless tobacco: Never   Substance and Sexual Activity    Alcohol use: Not Currently     Comment: socially    Drug use: Not Currently    Sexual activity: Yes     Birth control/protection: None       Family History   Problem Relation Name Age of Onset    Hypertension Father      Intellectual disability Brother      Gestational diabetes Paternal Grandmother      Breast cancer Paternal Grandmother            Review of systems negative except as noted above    Objective:   Physical Exam:     right knee  Skin atraumatic   mild effusion  Anterior knee pain, no J sign, patella translates <2 quadrants  No tender to palpation medial joint line, no tender to palpation lateral joint line  ROM 0-115  Negative Aram  Stable anterior/posterior   Stable varus/valgus  No groin pain with rotation of hip  Grossly NVI distally    left knee  Skin atraumatic   mild effusion  Anterior knee pain, no J sign, patella translates <2 quadrants  Some patellar crepitus  No tender to palpation medial joint line, no tender to palpation lateral joint line  ROM 0-115  Equivocal Aram  Stable anterior/posterior   Stable varus/valgus  No groin pain with rotation of hip  Grossly NVI distally    Imaging independently interpreted:  No significant osteoarthritis    Assessment:        Johana Spann is a 28 y.o. female with bilateral patellofemoral pain  Encounter Diagnoses   Name Primary?    Chronic pain of right knee Yes    Chronic pain of left knee     Quadriceps weakness        Plan :     -we will try a course of PT before ordering an MRI. Patient should f/u with me after approx 1 mo of PT to consider an MRI at that point for possible meniscal pathology  -we injected the bilateral knee w 1cc of ketorolac, marcaine and lidocaine under sterile conditions with the patient's informed consent for patellofemoral pain, concern for possible meniscus injury  -Bilateral Knee sleeves        Orders Placed This Encounter   Procedures    Large Joint Aspiration/Injection: bilateral knee    Ambulatory referral/consult to Physical/Occupational Therapy        Soto Gonzalez MD   04/16/2024

## 2024-04-16 NOTE — PROCEDURES
Large Joint Aspiration/Injection: bilateral knee    Date/Time: 4/16/2024 11:00 AM    Performed by: Claude Leal MD  Authorized by: Claude Leal MD    Consent Done?:  Yes (Verbal)  Indications:  Arthritis  Site marked: the procedure site was marked    Timeout: prior to procedure the correct patient, procedure, and site was verified    Prep: patient was prepped and draped in usual sterile fashion      Local anesthesia used?: Yes    Local anesthetic:  Topical anesthetic    Details:  Needle Size:  22 G  Ultrasonic Guidance for needle placement?: No    Approach:  Anterolateral  Location:  Knee  Laterality:  Bilateral  Site:  Bilateral knee  Medications (Right):  30 mg ketorolac 30 mg/mL (1 mL); 5 mL BUPivacaine 0.5 % (5 mg/mL); 4 mL LIDOcaine HCL 10 mg/ml (1%) 10 mg/mL (1 %)  Medications (Left):  30 mg ketorolac 30 mg/mL (1 mL); 5 mL BUPivacaine 0.5 % (5 mg/mL); 4 mL LIDOcaine HCL 10 mg/ml (1%) 10 mg/mL (1 %)  Patient tolerance:  Patient tolerated the procedure well with no immediate complications

## 2024-05-14 ENCOUNTER — CLINICAL SUPPORT (OUTPATIENT)
Dept: REHABILITATION | Facility: HOSPITAL | Age: 29
End: 2024-05-14
Attending: ORTHOPAEDIC SURGERY
Payer: OTHER GOVERNMENT

## 2024-05-14 DIAGNOSIS — R29.898 WEAKNESS OF BOTH HIPS: ICD-10-CM

## 2024-05-14 DIAGNOSIS — G89.29 CHRONIC PAIN OF LEFT KNEE: Primary | ICD-10-CM

## 2024-05-14 DIAGNOSIS — G89.29 CHRONIC PAIN OF RIGHT KNEE: ICD-10-CM

## 2024-05-14 DIAGNOSIS — M25.562 CHRONIC PAIN OF LEFT KNEE: Primary | ICD-10-CM

## 2024-05-14 DIAGNOSIS — M25.561 CHRONIC PAIN OF RIGHT KNEE: ICD-10-CM

## 2024-05-14 PROCEDURE — 97112 NEUROMUSCULAR REEDUCATION: CPT | Mod: PO

## 2024-05-14 PROCEDURE — 97161 PT EVAL LOW COMPLEX 20 MIN: CPT | Mod: PO

## 2024-05-14 NOTE — PLAN OF CARE
[facility]  Physical Therapy Initial Evaluation    Date: 5/14/2024   Name: Johana Spann  Clinic Number: [id number]    Therapy Diagnosis:   Encounter Diagnoses   Name Primary?    Chronic pain of right knee     Chronic pain of left knee Yes    Weakness of both hips      Physician: Claude Leal MD    Physician Orders: PT Eval and Treat   Medical Diagnosis from Referral: Chronic pain of right knee [M25.561, G89.29], Chronic pain of left knee [M25.562, G89.29]   Evaluation Date: 5/14/2024  Authorization Period Expiration: 4/16/2025  Plan of Care Expiration: 8/14/2024  Visit # / Visits authorized: 1/ Eval    Time In: 1115 (pt late upon arrival)  Time Out: 1210  Total Appointment Time (timed & untimed codes): 55 minutes    Precautions: Standard and blood and bodily fluid    Subjective   Date of onset: Aprox 2 years  History of current condition - Johana reports: Around Dec 2022 was moving sideboards off the truck (in the ) and the boards hit both knees. Since the incident pt reports intermittent B knee pain w/ L generally > R. Pt reports occasional buckling in the L. Pt reports frequent non painful popping, and occasional painful catching on the L. Pt denies N&T and radiating pain. Pt reports persistent aching in the R. Pt reports worsening of sx w/ ambulation, stair negotiation, and transfers after long periods of sitting. Pt reports increased stiffness/pain in the morning.  Pt reports receiving 2 injections with temporary relief for a week or so, but has not had incidences of buckling since injections.     Pt attempted to start cross fit in Jan of this year but d/c 2/2 to increased knee pain. Started cross fit to prepare for physical fitness test for [employer].     Medical History:   Past Medical History:   Diagnosis Date    Essential hypertension 11/18/2020 10:10:35 AM    [facility] Historical - Cardiovascular: Hypertension-No Additional Notes    Essential hypertension 11/18/2020  10:10:35 AM    [facility] Historical - Cardiovascular: Hypertension-No Additional Notes    Gonococcal infection (acute) of lower genitourinary tract 11/18/2020 10:17:14 AM    [facility] Historical - Gynecologic: Gonorrhea-No Additional Notes    Gonococcal infection (acute) of lower genitourinary tract 11/18/2020 10:17:14 AM    [facility] Historical - Gynecologic: Gonorrhea-No Additional Notes    Gonorrhea     Herpes simplex 11/18/2020 10:16:32 AM    [facility] - Quick Add: HSV (herpes simplex virus) anogenital infection-No Additional Notes    Herpes simplex 11/18/2020 10:16:32 AM    [facility] - Quick Add: HSV (herpes simplex virus) anogenital infection-No Additional Notes    History of other drug therapy 9/20/2021 1:37:11 PM    [facility] Historical - Unknown: COVID-19 vaccine series completed-No Additional Notes    History of other drug therapy 9/20/2021 1:37:11 PM    [facility] Historical - Unknown: COVID-19 vaccine series completed-No Additional Notes    HSV infection     HTN (hypertension)        Surgical History:   Johana Spann  has a past surgical history that includes Tonsillectomy and Salpingoophorectomy (Left).    Medications:   Johana has a current medication list which includes the following prescription(s): amlodipine, aspirin, ferrous sulfate, id now covid-19 test kit, naproxen, prenatal vit no.124/iron/folic, and valacyclovir.    Allergies:   Review of patient's allergies indicates:  No Known Allergies     Imaging:  XR KNEE COMP 4 OR MORE VIEWS BILAT     CLINICAL HISTORY:  Pain in right knee     TECHNIQUE:  Bilateral knees, 6 views total.     COMPARISON:  Plain film from 12/03/2022     FINDINGS:  No evidence of fracture or dislocation.  No evidence of erosions.  No joint effusion.  Joint spaces are satisfactorily maintained.  No radiopaque foreign body.  No soft tissue abnormality.     Impression:     No evidence of acute fracture or dislocation.         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) ;  for the [employer]  Prior Level of Function: Limited w/ ADLs/IADLs, work, health related, and recreational activities and caretaker duties  Current Level of Function: Continued limitations w/ ADLs/IADLs, work, health related, and recreational activities and caretaker duties    Pain:  Current 3/10, worst 7/10, best 0/10   Location: bilateral knee  Description: Aching, Throbbing, Grabbing, and Sharp  Aggravating Factors: Sitting, Standing, Walking, Morning, and Getting out of bed/chair  Easing Factors: pain medication, ice, and heat    Patients goals: Pain free ADLs, ability to perform fitness routine and physical fitness test for [employer]    Objective   Gait:   Pt ambulates w/ B Trendelenburg w/ R > L, increased L tibial ER, and increased B hip ADD    Observation:   B tibial ER L > R, B hip MR    Functional:    Squat: B knee valgus/hip MR,  increased forward lean and WB through forefoot,  L weight shift, increased lumbar ext    SL Squat: increased hip ADD/MR/knee valgus B *pain    SLS: 20s B w/ increased hip MR/knee valgus, increased episodic buckling on L, increased hip/ankle strategy    Range of Motion:   Knee Left active Left Passive Right Active R passive   Flexion 130 140 *pain 130 140   Extension 5 10 5 10       Joint Mobility:  B patellar hypomobility in lateral / superior directions ; fat pad hypomobility    Lower Extremity Strength    Right LE  Left LE    Knee extension: 5/5 Knee extension: 5/5   Knee flexion: 4-/5 Knee flexion: 4-/5   Hip flexion: 4/5 Hip flexion: 4/5   Hip extension:  3+/5 Hip extension: 3+/5   PGM: 3-/5 PGM:  3-/5   Ankle dorsiflexion: 4+/5 Ankle  dorsiflexion: 4+/5   Ankle plantarflexion: 5/5 Ankle plantarflexion: 5/5       Special Tests:  - Valgus stress test: (+ L )  - Varus stress test: ( - )  - Aram's: (+ B)  - Anterior Drawer: ( - )    Palpation:  TTP+ = tender to palpation    Lateral joint line B, quad tendon B    Sensation: WNL    Flexibility:  Quad short / stiff B , HS WNL      Edema: min to none L      Limitation/Restriction for FOTO Knee Survey    Therapist reviewed FOTO scores for Johana Spann on 5/14/2024.   FOTO documents entered into EPIC - see Media section.    Limitation Score: 34%  Predcited Limitation Score: 19%         TREATMENT   Treatment Time In: 1115 (pt late upon arrival)  Treatment Time Out: 1210  Total Treatment time (time-based codes) separate from Evaluation: 25 minutes    Neuromuscular re-education activities to improve: Balance, Coordination, Kinesthetic, Sense, Proprioception, and Posture for 25 minutes. The following activities were included:    Bridges x 10 > GTB x 10 (pt educated on preventing R pelvic rot, full hip height, hip hinge))  Clamshells GTB 2 x 10 / side (pt educated on preventing pelvic rot)    SL mini squat x 10 / side (pt educated on preventing hip MR / knee valgus)    Home Exercises and Patient Education Provided    Education provided:   - HEP  - POC/prognosis    Written Home Exercises Provided: yes.  Exercises were reviewed and Johana was able to demonstrate them prior to the end of the session.  Johana demonstrated good  understanding of the education provided.     See EMR under Patient Instructions for exercises provided 5/14/2024.    Assessment   Johana is a 28 y.o. female referred to outpatient Physical Therapy with a medical diagnosis of Chronic pain of right knee [M25.561, G89.29], Chronic pain of left knee [M25.562, G89.29] . Patient presents with painful knee ROM, patellar and fat pad hypomobility, joint line tenderness, impaired NM control of LPHC, and hip weakness with limitations in ambulation,  stair negotiation, transfers, and ADLs/IADLs with limited ability to participate in work, recreational, and health related activities and caretaker duties.    Patient prognosis is Good.   Patientt will benefit from skilled outpatient Physical Therapy to address the deficits stated above and in the chart below, provide patient /family education, and to maximize patientt's level of independence.     Plan of care discussed with patient: Yes  Patient's spiritual, cultural and educational needs considered and patient is agreeable to the plan of care and goals as stated below:     Anticipated Barriers for therapy: Chronicity of sx    Medical Necessity is demonstrated by the following  History  Co-morbidities and personal factors that may impact the plan of care Co-morbidities:   HTN    Personal Factors:   lifestyle     low   Examination  Body Structures and Functions, activity limitations and participation restrictions that may impact the plan of care Body Regions:   lower extremities  trunk    Body Systems:    gross symmetry  ROM  strength  gross coordinated movement  balance  gait  transfers  transitions  motor control    Participation Restrictions:   Work, recreational, and health related activities, and caretaker duties    Activity limitations:   Learning and applying knowledge  no deficits    General Tasks and Commands  no deficits    Communication  no deficits    Mobility  walking    Self care  looking after one's health    Domestic Life  doing house work (cleaning house, washing dishes, laundry)  assisting others    Interactions/Relationships  no deficits    Life Areas  employment    Community and Social Life  community life  recreation and leisure         high   Clinical Presentation stable and uncomplicated low   Decision Making/ Complexity Score: low     Goals:  Short Term Goals: (4-6 weeks)  1. Pt will be independent with HEP in order to supplement patient in improving functional mobility. - progressing, not  met  2. Pt will improve ( B ) knee AROM to full and pain free in order to improve gait. - progressing, not met  3. Pt will improve hip flexor/quadriceps mobility to WNL in order to improve hip/knee AROM and improved gait function - progressing, not met  4. Pt will improve hip abduction strength from 3-/5 to 4/5 to improve functional gait deviation. - progressing, not met     Long Term Goals: (10-12 weeks)  1. Pt will be independent with updated HEP supplement PT in improving functional mobility. - progressing, not met  2. Pt will improve FOTO knee survey score to </= 19 % limited in order to demo improved functional mobility. - progressing, not met  3. Pt goal: return to fitness routine  4. Pt to demo tolerance to a squat at or bellow parallel with uncompensated mechanics x10 . - progressing, not met    Plan   Plan of care Certification: 5/14/2024 to 8/14/2024.    Outpatient Physical Therapy 2 times weekly for 12 weeks to include the following interventions: Gait Training, Manual Therapy, Moist Heat/ Ice, Neuromuscular Re-ed, Patient Education, Therapeutic Activities, and Therapeutic Exercise.     Indra Thompson, PT, DPT

## 2024-05-15 ENCOUNTER — CLINICAL SUPPORT (OUTPATIENT)
Dept: REHABILITATION | Facility: HOSPITAL | Age: 29
End: 2024-05-15
Payer: COMMERCIAL

## 2024-05-15 DIAGNOSIS — M25.562 CHRONIC PAIN OF LEFT KNEE: Primary | ICD-10-CM

## 2024-05-15 DIAGNOSIS — R29.898 WEAKNESS OF BOTH HIPS: ICD-10-CM

## 2024-05-15 DIAGNOSIS — G89.29 CHRONIC PAIN OF RIGHT KNEE: ICD-10-CM

## 2024-05-15 DIAGNOSIS — G89.29 CHRONIC PAIN OF LEFT KNEE: Primary | ICD-10-CM

## 2024-05-15 DIAGNOSIS — M25.561 CHRONIC PAIN OF RIGHT KNEE: ICD-10-CM

## 2024-05-15 PROCEDURE — 97140 MANUAL THERAPY 1/> REGIONS: CPT | Mod: PO

## 2024-05-15 PROCEDURE — 97112 NEUROMUSCULAR REEDUCATION: CPT | Mod: PO

## 2024-05-15 PROCEDURE — 97110 THERAPEUTIC EXERCISES: CPT | Mod: PO

## 2024-05-15 NOTE — PROGRESS NOTES
[facility]   Physical Therapy Treatment Note     Name: Johana Spann  Clinic Number: [id number]    Therapy Diagnosis:   Encounter Diagnoses   Name Primary?    Chronic pain of left knee Yes    Chronic pain of right knee     Weakness of both hips      Physician: Claude Leal MD    Visit Date: 5/15/2024    Physician Orders: PT Eval and Treat   Medical Diagnosis from Referral: Chronic pain of right knee [M25.561, G89.29], Chronic pain of left knee [M25.562, G89.29]   Evaluation Date: 5/14/2024  Authorization Period Expiration: 4/16/2025  Plan of Care Expiration: 8/14/2024  Visit # / Visits authorized: 1/20 + Eval     Time In: 1700  Time Out: 1800  Total Appointment Time (timed & untimed codes): 60 minutes     Precautions: Standard and blood and bodily fluid    FOTO 1st: 66%  FOTO 3rd:  FOTO 10th:      SUBJECTIVE     Pt reports: Has practiced HEP. No sig changes since IE.    She was compliant with home exercise program.  Response to previous treatment: Ongoing  Functional change: Ongoing    Pain: Did not verbalize/10  Location: bilateral knee      OBJECTIVE     Objective Measures updated at progress report unless specified.     Treatment       Johana received the treatments listed below:      Therapeutic exercises to develop strength, endurance, ROM, flexibility, posture, and core stabilization for 08 minutes including:    Prone quad stretch w/ half FR 3 x 1 m in /side + set up (pt educated on not forcing stretch, avoiding valgus stresses during pull)    Manual therapy techniques: Joint mobilizations, Myofacial release, and Soft tissue Mobilization were applied to the: B knee for 09 minutes, including:    Patellar mobs at multiple ranges of flex (bias sup)  Fat pad mobs at multiple ranges of flex    Therapeutic activities to improve functional performance for 00  minutes, including:      Neuromuscular re-education activities to improve: Balance, Coordination, Kinesthetic, Sense, Proprioception,  "and Posture for 43 minutes. The following activities were included:    SL runner squat x 10 + 10 after bridge/clamshell (pt educated on even WB through tripod of foot, preventing hip MR/knee valgus, use of gluts for knee control)     Bridges GTB 3 x 10 (pt educated on preventing hip rot, full hip height)  Clamshells GTB 3 x 10 / side (pt educated on preventing pelvic rot, controlled eccentrics)     SB HS curl to quad/glut set 3 x 10    Heel taps 2" 3 x 10 / side (pt educated on preventing hip MR/knee valgus)        Patient Education and Home Exercises     Home Exercises Provided and Patient Education Provided     Education provided:   - As mentioned above    Written Home Exercises Provided: Patient instructed to cont prior HEP. Exercises were reviewed and Johana was able to demonstrate them prior to the end of the session.  Johana demonstrated good  understanding of the education provided. See EMR under Patient Instructions for exercises provided during therapy sessions    ASSESSMENT     Pt completed session as noted above. Pt benefits from max verbal, visual and tactile cues in addition to mirror for external visual cues to improve hip MR w/ knee valgus that improved w/ repetition. Pt educated on use of gluts for knee/hip control, and demo'd good carryover from 1st round of SL runner squat to the 2nd round through increased proprioceptive awareness of gluts after table exercises.     Johana Is progressing well towards her goals.     Pt prognosis is Good.     Pt will continue to benefit from skilled outpatient physical therapy to address the deficits listed in the problem list box on initial evaluation, provide pt/family education and to maximize pt's level of independence in the home and community environment.     Pt's spiritual, cultural and educational needs considered and pt agreeable to plan of care and goals.     Anticipated barriers to physical therapy: Chronicity of sx     Goals:   Short Term Goals: (4-6 " weeks)  1. Pt will be independent with HEP in order to supplement patient in improving functional mobility. - progressing, not met  2. Pt will improve ( B ) knee AROM to full and pain free in order to improve gait. - progressing, not met  3. Pt will improve hip flexor/quadriceps mobility to WNL in order to improve hip/knee AROM and improved gait function - progressing, not met  4. Pt will improve hip abduction strength from 3-/5 to 4/5 to improve functional gait deviation. - progressing, not met     Long Term Goals: (10-12 weeks)  1. Pt will be independent with updated HEP supplement PT in improving functional mobility. - progressing, not met  2. Pt will improve FOTO knee survey score to </= 19 % limited in order to demo improved functional mobility. - progressing, not met  3. Pt goal: return to fitness routine  4. Pt to demo tolerance to a squat at or bellow parallel with uncompensated mechanics x10 . - progressing, not met    PLAN     Cont PT as per POC    Indra Thompson, PT, DPT

## 2024-05-28 NOTE — Clinical Note
"Johana Aljoni Callejas was seen and treated in our emergency department on 9/11/2022.  She may return to work on 09/14/2022.       If you have any questions or concerns, please don't hesitate to call.      Yumiko Martel MD"
36.6

## 2024-06-04 ENCOUNTER — CLINICAL SUPPORT (OUTPATIENT)
Dept: REHABILITATION | Facility: HOSPITAL | Age: 29
End: 2024-06-04
Payer: OTHER GOVERNMENT

## 2024-06-04 DIAGNOSIS — G89.29 CHRONIC PAIN OF RIGHT KNEE: ICD-10-CM

## 2024-06-04 DIAGNOSIS — M25.562 CHRONIC PAIN OF LEFT KNEE: Primary | ICD-10-CM

## 2024-06-04 DIAGNOSIS — M25.561 CHRONIC PAIN OF RIGHT KNEE: ICD-10-CM

## 2024-06-04 DIAGNOSIS — G89.29 CHRONIC PAIN OF LEFT KNEE: Primary | ICD-10-CM

## 2024-06-04 DIAGNOSIS — R29.898 WEAKNESS OF BOTH HIPS: ICD-10-CM

## 2024-06-04 PROCEDURE — 97112 NEUROMUSCULAR REEDUCATION: CPT | Mod: PO

## 2024-06-04 PROCEDURE — 97110 THERAPEUTIC EXERCISES: CPT | Mod: PO

## 2024-06-04 PROCEDURE — 97140 MANUAL THERAPY 1/> REGIONS: CPT | Mod: PO

## 2024-06-04 NOTE — PROGRESS NOTES
[facility]   Physical Therapy Treatment Note     Name: Johana Spann  Clinic Number: [id number]    Therapy Diagnosis:   Encounter Diagnoses   Name Primary?    Chronic pain of left knee Yes    Chronic pain of right knee     Weakness of both hips      Physician: Claude Leal MD    Visit Date: 6/4/2024    Physician Orders: PT Eval and Treat   Medical Diagnosis from Referral: Chronic pain of right knee [M25.561, G89.29], Chronic pain of left knee [M25.562, G89.29]   Evaluation Date: 5/14/2024  Authorization Period Expiration: 4/16/2025  Plan of Care Expiration: 8/14/2024  Visit # / Visits authorized: 2/20 + Eval     Time In: 1500  Time Out: 1600  Total Appointment Time (timed & untimed codes): 60 minutes     Precautions: Standard and blood and bodily fluid    FOTO 1st: 66%  FOTO 3rd:  FOTO 10th:      SUBJECTIVE     Pt reports: her L knee is more painful since she running 7 miles for  training. She states clicking sensation occasionally during walking.     She was compliant with home exercise program.  Response to previous treatment: Ongoing  Functional change: Ongoing    Pain: Did not verbalize/10  Location: bilateral knee      OBJECTIVE     Objective Measures updated at progress report unless specified.     Treatment       Johana received the treatments listed below:      Therapeutic exercises to develop strength, endurance, ROM, flexibility, posture, and core stabilization for 10 minutes including:    Prone quad stretch w/ half FR 3 x 1 m in /side + set up (pt educated on not forcing stretch, avoiding valgus stresses during pull)    Bike: 10 min L3.0    Manual therapy techniques: Joint mobilizations, Myofacial release, and Soft tissue Mobilization were applied to the: B knee for 09 minutes, including:    Patellar mobs at multiple ranges of flex (bias sup)  Fat pad mobs at multiple ranges of flex    Therapeutic activities to improve functional performance for 00  minutes,  "including:      Neuromuscular re-education activities to improve: Balance, Coordination, Kinesthetic, Sense, Proprioception, and Posture for 40 minutes. The following activities were included:    SL runner squat x 10 + 10 after bridge/clamshell (pt educated on even WB through tripod of foot, preventing hip MR/knee valgus, use of gluts for knee control)     Bridges GTB 3 x 10 (pt educated on preventing hip rot, full hip height)  Clamshells GTB 3 x 10 / side (pt educated on preventing pelvic rot, controlled eccentrics)     LAQ: 10 x3 8# colby (pt educated knee stabilization and preventing Knee ER)    Shuttle leg press (lower extremity endurance training) - 30 DL, 20 SL      SB HS curl to quad/glut set 3 x 10    Heel taps 2" 3 x 10 / side (pt educated on preventing hip MR/knee valgus)      Patient Education and Home Exercises     Home Exercises Provided and Patient Education Provided     Education provided:   - As mentioned above    Written Home Exercises Provided: Patient instructed to cont prior HEP. Exercises were reviewed and Johana was able to demonstrate them prior to the end of the session.  Johana demonstrated good  understanding of the education provided. See EMR under Patient Instructions for exercises provided during therapy sessions    ASSESSMENT     Pt completed session as noted above. Pt didn't report any pain throughout the entire session. Pt needs verbal cuing for gluteus medius activation and correct knee valgus position. Will continue to add in exercises.     Johana Is progressing well towards her goals.     Pt prognosis is Good.     Pt will continue to benefit from skilled outpatient physical therapy to address the deficits listed in the problem list box on initial evaluation, provide pt/family education and to maximize pt's level of independence in the home and community environment.     Pt's spiritual, cultural and educational needs considered and pt agreeable to plan of care and goals.     Anticipated " barriers to physical therapy: Chronicity of sx     Goals:   Short Term Goals: (4-6 weeks)  1. Pt will be independent with HEP in order to supplement patient in improving functional mobility. - progressing, not met  2. Pt will improve ( B ) knee AROM to full and pain free in order to improve gait. - progressing, not met  3. Pt will improve hip flexor/quadriceps mobility to WNL in order to improve hip/knee AROM and improved gait function - progressing, not met  4. Pt will improve hip abduction strength from 3-/5 to 4/5 to improve functional gait deviation. - progressing, not met     Long Term Goals: (10-12 weeks)  1. Pt will be independent with updated HEP supplement PT in improving functional mobility. - progressing, not met  2. Pt will improve FOTO knee survey score to </= 19 % limited in order to demo improved functional mobility. - progressing, not met  3. Pt goal: return to fitness routine  4. Pt to demo tolerance to a squat at or bellow parallel with uncompensated mechanics x10 . - progressing, not met    PLAN     Cont PT as per POC    Basilio James, PT, DPT

## 2024-06-06 ENCOUNTER — OFFICE VISIT (OUTPATIENT)
Dept: ORTHOPEDICS | Facility: CLINIC | Age: 29
End: 2024-06-06
Payer: COMMERCIAL

## 2024-06-06 VITALS — HEIGHT: 62 IN | WEIGHT: 160.06 LBS | BODY MASS INDEX: 29.45 KG/M2

## 2024-06-06 DIAGNOSIS — M25.562 ACUTE PAIN OF LEFT KNEE: Primary | ICD-10-CM

## 2024-06-06 DIAGNOSIS — M17.10 UNILATERAL PRIMARY OSTEOARTHRITIS, UNSPECIFIED KNEE: ICD-10-CM

## 2024-06-06 DIAGNOSIS — M25.462 EFFUSION OF LEFT KNEE: ICD-10-CM

## 2024-06-06 PROCEDURE — 99213 OFFICE O/P EST LOW 20 MIN: CPT | Mod: PBBFAC,PN | Performed by: ORTHOPAEDIC SURGERY

## 2024-06-06 PROCEDURE — 99999 PR PBB SHADOW E&M-EST. PATIENT-LVL III: CPT | Mod: PBBFAC,,, | Performed by: ORTHOPAEDIC SURGERY

## 2024-06-06 PROCEDURE — 99213 OFFICE O/P EST LOW 20 MIN: CPT | Mod: ,,, | Performed by: ORTHOPAEDIC SURGERY

## 2024-06-06 PROCEDURE — G2211 COMPLEX E/M VISIT ADD ON: HCPCS | Mod: ,,, | Performed by: ORTHOPAEDIC SURGERY

## 2024-06-06 NOTE — PROGRESS NOTES
Subjective:      Patient ID: Johana Spann is a 28 y.o. female.    Chief Complaint: Pain of the Left Knee and Pain of the Right Knee    Knee Pain: left  swelling: Yes  worsens with activity: Yes  relieved by: nsaid's  No improvement w PT and only 2 weeks relief w injection           Social History     Occupational History    Not on file   Tobacco Use    Smoking status: Never    Smokeless tobacco: Never   Substance and Sexual Activity    Alcohol use: Not Currently     Comment: socially    Drug use: Not Currently    Sexual activity: Yes     Birth control/protection: None      Social Determinants of Health     Tobacco Use: Low Risk  (6/6/2024)    Patient History     Smoking Tobacco Use: Never     Smokeless Tobacco Use: Never     Passive Exposure: Not on file   Alcohol Use: Not on file   Financial Resource Strain: Not on file   Food Insecurity: Not on file   Transportation Needs: Not on file   Physical Activity: Not on file   Stress: Not on file   Housing Stability: Not on file   Depression: Not on file   Utilities: Not on file   Health Literacy: Not on file   Social Isolation: Not on file      Review of Systems   Constitutional: Negative for diaphoresis.   HENT:  Negative for ear discharge, nosebleeds and stridor.    Eyes:  Negative for photophobia.   Cardiovascular:  Negative for syncope.   Respiratory:  Negative for hemoptysis, shortness of breath and wheezing.    Neurological:  Negative for tremors.   Psychiatric/Behavioral: Negative.           Objective:    General    Constitutional: She is oriented to person, place, and time. She appears well-developed and well-nourished.   HENT:   Head: Normocephalic and atraumatic.   Right Ear: External ear normal.   Left Ear: External ear normal.   Eyes: EOM are normal.   Pulmonary/Chest: Effort normal.   Neurological: She is alert and oriented to person, place, and time.   Psychiatric: She has a normal mood and affect. Her behavior is normal. Judgment and thought content  normal.     General Musculoskeletal Exam   Gait: antalgic and abnormal         Left Knee Exam     Inspection   Swelling: present  Effusion: present    Tenderness   The patient tender to palpation of the medial joint line.    Crepitus   The patient has crepitus of the patella.    Other   Sensation: normal    Muscle Strength   Left Lower Extremity   Quadriceps:  4/5   Hamstrin/5          Assessment:       1. Acute pain of left knee    2. Unilateral primary osteoarthritis, unspecified knee    3. Effusion of left knee          Plan:   I had a lengthy discussion with the patient regarding various sources of knee pain.  The patient is receiving minimal relief with NSAIDs, injections, and have failed physical therapy/home exercise. At this point, given the acuity of the symptoms, mechanical in nature, physical examination and degenerative findings on radiographs, I think an MRI is warranted to evaluate for meniscus tear. We will try to help arrange this. I will see the patient back once the MRI is complete.

## 2024-06-12 ENCOUNTER — CLINICAL SUPPORT (OUTPATIENT)
Dept: REHABILITATION | Facility: HOSPITAL | Age: 29
End: 2024-06-12
Payer: COMMERCIAL

## 2024-06-12 DIAGNOSIS — M25.562 ACUTE PAIN OF LEFT KNEE: ICD-10-CM

## 2024-06-12 DIAGNOSIS — M25.562 CHRONIC PAIN OF LEFT KNEE: Primary | ICD-10-CM

## 2024-06-12 DIAGNOSIS — G89.29 CHRONIC PAIN OF LEFT KNEE: Primary | ICD-10-CM

## 2024-06-12 DIAGNOSIS — R29.898 WEAKNESS OF BOTH HIPS: ICD-10-CM

## 2024-06-12 PROCEDURE — 97112 NEUROMUSCULAR REEDUCATION: CPT | Mod: PO

## 2024-06-12 PROCEDURE — 97530 THERAPEUTIC ACTIVITIES: CPT | Mod: PO

## 2024-06-12 PROCEDURE — 97110 THERAPEUTIC EXERCISES: CPT | Mod: PO

## 2024-06-12 PROCEDURE — 97140 MANUAL THERAPY 1/> REGIONS: CPT | Mod: PO

## 2024-06-17 NOTE — PROGRESS NOTES
[facility]   Physical Therapy Treatment Note     Name: Johana Spann  Clinic Number: [id number]    Therapy Diagnosis:   Encounter Diagnoses   Name Primary?    Chronic pain of left knee Yes    Acute pain of left knee     Weakness of both hips      Physician: Claude Leal MD    Visit Date: 6/12/2024    Physician Orders: PT Eval and Treat   Medical Diagnosis from Referral: Chronic pain of right knee [M25.561, G89.29], Chronic pain of left knee [M25.562, G89.29]   Evaluation Date: 5/14/2024  Authorization Period Expiration: 4/16/2025  Plan of Care Expiration: 8/14/2024  Visit # / Visits authorized: 3/20 + Eval     Time In: 1700  Time Out: 1800  Total Appointment Time (timed & untimed codes): 60 minutes     Precautions: Standard and blood and bodily fluid    FOTO 1st: 66%  FOTO 3rd:  FOTO 10th:      SUBJECTIVE     Pt reports: Improved overall knee pain, Some pain today after coming back from condition for     She was compliant with home exercise program.  Response to previous treatment: Ongoing  Functional change: Ongoing    Pain: Did not verbalize/10  Location: bilateral knee      OBJECTIVE     SL runner squat: min to no knee valgus errors    SL calf raise height 50% of DL height    Treatment       Johana received the treatments listed below:      Therapeutic exercises to develop strength, endurance, ROM, flexibility, posture, and core stabilization for 10 minutes including:    Bike lvl5 10' for improved cardiovascular and muscular endurance, and tissue extensibility    NT  Prone quad stretch w/ half FR 3 x 1 m in /side + set up (pt educated on not forcing stretch, avoiding valgus stresses during pull)    Manual therapy techniques: Joint mobilizations, Myofacial release, and Soft tissue Mobilization were applied to the: B knee for 11 minutes, including:    Patellar mobs at multiple ranges of flex (bias supior/med)  Fat pad mobs at multiple ranges of flex  Hands on practice of self  "patellar and fat pad mobs    Therapeutic activities to improve functional performance for 18 minutes, including:    SL runner squat 3 x 10 / side(pt educated on even WB through tripod of foot, preventing hip MR/knee valgus, use of gluts for knee control)     DL heel raise on half FR 3 x 10 (pt educated on quad control of TKE)  SL sneaky calf raise 3 x 10 / side (pt educated on maintaining knee over toe)    Pt education on SL calf raise goals / practice on 2 up 1 down calf raise    Neuromuscular re-education activities to improve: Balance, Coordination, Kinesthetic, Sense, Proprioception, and Posture for 21 minutes. The following activities were included:    Bridges BTB 3 x 10 (pt educated on preventing hip rot, full hip height)  Clamshells BTB 3 x 10 / side (pt educated on preventing pelvic rot, controlled eccentrics)     SB HS curl to quad/glut set 3 x 10      NT  Heel taps 2" 3 x 10 / side (pt educated on preventing hip MR/knee valgus)        Patient Education and Home Exercises     Home Exercises Provided and Patient Education Provided     Education provided:   - As mentioned above    Written Home Exercises Provided: Patient instructed to cont prior HEP. Exercises were reviewed and Johana was able to demonstrate them prior to the end of the session.  Johana demonstrated good  understanding of the education provided. See EMR under Patient Instructions for exercises provided during therapy sessions    ASSESSMENT     Pt completed session as noted above. Pt demo'd good carryover from previous session with preventing knee valgus/hip MR w/ SL runner squats with min to no errors. Pt will benefit from continued calf strength to improve tolerance for running and conditioning.    Johana Is progressing well towards her goals.     Pt prognosis is Good.     Pt will continue to benefit from skilled outpatient physical therapy to address the deficits listed in the problem list box on initial evaluation, provide pt/family education " and to maximize pt's level of independence in the home and community environment.     Pt's spiritual, cultural and educational needs considered and pt agreeable to plan of care and goals.     Anticipated barriers to physical therapy: Chronicity of sx     Goals:   Short Term Goals: (4-6 weeks)  1. Pt will be independent with HEP in order to supplement patient in improving functional mobility. - progressing, not met  2. Pt will improve ( B ) knee AROM to full and pain free in order to improve gait. - progressing, not met  3. Pt will improve hip flexor/quadriceps mobility to WNL in order to improve hip/knee AROM and improved gait function - progressing, not met  4. Pt will improve hip abduction strength from 3-/5 to 4/5 to improve functional gait deviation. - progressing, not met     Long Term Goals: (10-12 weeks)  1. Pt will be independent with updated HEP supplement PT in improving functional mobility. - progressing, not met  2. Pt will improve FOTO knee survey score to </= 19 % limited in order to demo improved functional mobility. - progressing, not met  3. Pt goal: return to fitness routine  4. Pt to demo tolerance to a squat at or bellow parallel with uncompensated mechanics x10 . - progressing, not met    PLAN     Cont PT as per POC    Indra Thompson, PT, DPT

## 2024-07-16 ENCOUNTER — OFFICE VISIT (OUTPATIENT)
Dept: ORTHOPEDICS | Facility: CLINIC | Age: 29
End: 2024-07-16
Payer: COMMERCIAL

## 2024-07-16 VITALS — WEIGHT: 160.06 LBS | HEIGHT: 62 IN | BODY MASS INDEX: 29.45 KG/M2

## 2024-07-16 DIAGNOSIS — M17.12 PRIMARY OSTEOARTHRITIS OF LEFT KNEE: ICD-10-CM

## 2024-07-16 DIAGNOSIS — S83.412A SPRAIN OF MEDIAL COLLATERAL LIGAMENT OF LEFT KNEE, INITIAL ENCOUNTER: Primary | ICD-10-CM

## 2024-07-16 PROCEDURE — 99999 PR PBB SHADOW E&M-EST. PATIENT-LVL III: CPT | Mod: PBBFAC,,, | Performed by: ORTHOPAEDIC SURGERY

## 2024-07-16 PROCEDURE — 99214 OFFICE O/P EST MOD 30 MIN: CPT | Mod: S$GLB,,, | Performed by: ORTHOPAEDIC SURGERY

## 2024-07-16 PROCEDURE — G2211 COMPLEX E/M VISIT ADD ON: HCPCS | Mod: S$GLB,,, | Performed by: ORTHOPAEDIC SURGERY

## 2024-07-16 PROCEDURE — 99213 OFFICE O/P EST LOW 20 MIN: CPT | Mod: PBBFAC,PN | Performed by: ORTHOPAEDIC SURGERY

## 2024-07-16 NOTE — PROGRESS NOTES
Subjective:      Patient ID: Johana Spann is a 29 y.o. female.    Chief Complaint: Pain and Results of the Left Knee    Patient presents today for follow-up.  MRI shows MCL sprain.  No other real significant intra-articular pathology there was then some mild OA.  She complains to complain of medially based knee pain.      Social History     Occupational History    Not on file   Tobacco Use    Smoking status: Never    Smokeless tobacco: Never   Substance and Sexual Activity    Alcohol use: Not Currently     Comment: socially    Drug use: Not Currently    Sexual activity: Yes     Birth control/protection: None      Social Determinants of Health     Tobacco Use: Low Risk  (7/16/2024)    Patient History     Smoking Tobacco Use: Never     Smokeless Tobacco Use: Never     Passive Exposure: Not on file   Alcohol Use: Not on file   Financial Resource Strain: Not on file   Food Insecurity: Not on file   Transportation Needs: Not on file   Physical Activity: Not on file   Stress: Not on file   Housing Stability: Not on file   Depression: Not on file   Utilities: Not on file   Health Literacy: Not on file   Social Isolation: Not on file      Review of Systems   Constitutional: Negative for diaphoresis.   HENT:  Negative for ear discharge, nosebleeds and stridor.    Eyes:  Negative for photophobia.   Cardiovascular:  Negative for syncope.   Respiratory:  Negative for hemoptysis, shortness of breath and wheezing.    Neurological:  Negative for tremors.   Psychiatric/Behavioral: Negative.           Objective:    Ortho/SPM Exam       Assessment:       1. Sprain of medial collateral ligament of left knee, initial encounter    2. Primary osteoarthritis of left knee          Plan:   We will give her a hinged range-of-motion knee brace for the right knee.  We will also start hyaluronic acid injections.  We will see how this helps her symptomatology.

## 2024-08-02 ENCOUNTER — TELEPHONE (OUTPATIENT)
Dept: ORTHOPEDICS | Facility: CLINIC | Age: 29
End: 2024-08-02
Payer: OTHER GOVERNMENT

## 2024-08-02 NOTE — TELEPHONE ENCOUNTER
LVM for patient that her Ken is still pending insurance approval and appointment has been moved to next Monday at 8 a.m.

## 2024-08-07 DIAGNOSIS — M17.12 PRIMARY OSTEOARTHRITIS OF LEFT KNEE: Primary | ICD-10-CM

## 2024-08-08 ENCOUNTER — TELEPHONE (OUTPATIENT)
Dept: ORTHOPEDICS | Facility: CLINIC | Age: 29
End: 2024-08-08
Payer: OTHER GOVERNMENT

## 2024-08-09 ENCOUNTER — TELEPHONE (OUTPATIENT)
Dept: ORTHOPEDICS | Facility: CLINIC | Age: 29
End: 2024-08-09
Payer: OTHER GOVERNMENT

## 2024-08-19 ENCOUNTER — TELEPHONE (OUTPATIENT)
Dept: ORTHOPEDICS | Facility: CLINIC | Age: 29
End: 2024-08-19
Payer: OTHER GOVERNMENT

## 2024-08-19 NOTE — TELEPHONE ENCOUNTER
Spoke to patient. Informed her that [employer] is out and rescheduled her to Friday. Verbalized understanding.